# Patient Record
Sex: FEMALE | Race: WHITE | NOT HISPANIC OR LATINO | Employment: OTHER | URBAN - NONMETROPOLITAN AREA
[De-identification: names, ages, dates, MRNs, and addresses within clinical notes are randomized per-mention and may not be internally consistent; named-entity substitution may affect disease eponyms.]

---

## 2023-03-08 LAB
ANION GAP IN SER/PLAS: 13 MMOL/L (ref 10–20)
CALCIUM (MG/DL) IN SER/PLAS: 9.2 MG/DL (ref 8.6–10.3)
CARBON DIOXIDE, TOTAL (MMOL/L) IN SER/PLAS: 24 MMOL/L (ref 21–32)
CHLORIDE (MMOL/L) IN SER/PLAS: 104 MMOL/L (ref 98–107)
CREATININE (MG/DL) IN SER/PLAS: 0.77 MG/DL (ref 0.5–1.05)
GFR FEMALE: 78 ML/MIN/1.73M2
GLUCOSE (MG/DL) IN SER/PLAS: 138 MG/DL (ref 74–99)
POTASSIUM (MMOL/L) IN SER/PLAS: 3.3 MMOL/L (ref 3.5–5.3)
SODIUM (MMOL/L) IN SER/PLAS: 138 MMOL/L (ref 136–145)
UREA NITROGEN (MG/DL) IN SER/PLAS: 13 MG/DL (ref 6–23)

## 2023-08-31 PROBLEM — M46.1 SACROILIITIS, NOT ELSEWHERE CLASSIFIED (CMS-HCC): Status: ACTIVE | Noted: 2023-08-31

## 2023-08-31 PROBLEM — M85.80 OSTEOPENIA: Status: ACTIVE | Noted: 2023-08-31

## 2023-08-31 PROBLEM — M54.30 SCIATICA: Status: ACTIVE | Noted: 2023-08-31

## 2023-08-31 PROBLEM — M19.90 OSTEOARTHRITIS: Status: ACTIVE | Noted: 2023-08-31

## 2023-08-31 PROBLEM — K57.92 DIVERTICULITIS: Status: ACTIVE | Noted: 2023-08-31

## 2023-08-31 PROBLEM — I21.4 NON-ST ELEVATION MYOCARDIAL INFARCTION (NSTEMI) (MULTI): Status: ACTIVE | Noted: 2023-08-31

## 2023-08-31 PROBLEM — I48.91 ATRIAL FIBRILLATION WITH RVR (MULTI): Status: ACTIVE | Noted: 2023-08-31

## 2023-08-31 PROBLEM — I83.893 VARICOSE VEINS OF BOTH LEGS WITH EDEMA: Status: ACTIVE | Noted: 2023-08-31

## 2023-08-31 PROBLEM — K86.2 PANCREATIC CYST (HHS-HCC): Status: ACTIVE | Noted: 2023-08-31

## 2023-08-31 PROBLEM — H90.3 ASYMMETRIC SNHL (SENSORINEURAL HEARING LOSS): Status: ACTIVE | Noted: 2023-08-31

## 2023-08-31 PROBLEM — E78.5 HYPERLIPIDEMIA: Status: ACTIVE | Noted: 2023-08-31

## 2023-08-31 PROBLEM — N95.2 ATROPHIC VAGINITIS: Status: ACTIVE | Noted: 2023-08-31

## 2023-08-31 PROBLEM — E55.9 VITAMIN D DEFICIENCY: Status: ACTIVE | Noted: 2023-08-31

## 2023-08-31 PROBLEM — R73.03 PREDIABETES: Status: ACTIVE | Noted: 2023-08-31

## 2023-08-31 PROBLEM — E11.9 TYPE 2 DIABETES MELLITUS WITHOUT COMPLICATION, WITHOUT LONG-TERM CURRENT USE OF INSULIN (MULTI): Status: ACTIVE | Noted: 2023-08-31

## 2023-08-31 PROBLEM — E78.5 DYSLIPIDEMIA: Status: ACTIVE | Noted: 2023-08-31

## 2023-08-31 PROBLEM — H93.299 ABNORMAL AUDITORY PERCEPTION: Status: ACTIVE | Noted: 2023-08-31

## 2023-08-31 RX ORDER — DAPAGLIFLOZIN 5 MG/1
1 TABLET, FILM COATED ORAL DAILY
COMMUNITY
Start: 2023-03-13 | End: 2024-04-02 | Stop reason: SDUPTHER

## 2023-08-31 RX ORDER — NYSTATIN 100000 [USP'U]/G
1 POWDER TOPICAL AS NEEDED
COMMUNITY
Start: 2022-10-25

## 2023-08-31 RX ORDER — VIT C/E/ZN/COPPR/LUTEIN/ZEAXAN 250MG-90MG
1 CAPSULE ORAL DAILY
COMMUNITY
End: 2023-11-27 | Stop reason: ALTCHOICE

## 2023-08-31 RX ORDER — METOPROLOL SUCCINATE 50 MG/1
1 TABLET, EXTENDED RELEASE ORAL DAILY
COMMUNITY

## 2023-08-31 RX ORDER — ATORVASTATIN CALCIUM 40 MG/1
1 TABLET, FILM COATED ORAL DAILY
COMMUNITY
End: 2024-04-05 | Stop reason: SDUPTHER

## 2023-08-31 RX ORDER — CLOPIDOGREL BISULFATE 75 MG/1
1 TABLET ORAL DAILY
COMMUNITY
End: 2024-04-05 | Stop reason: SDUPTHER

## 2023-08-31 RX ORDER — LISINOPRIL 5 MG/1
1 TABLET ORAL DAILY
COMMUNITY
End: 2023-11-27 | Stop reason: ALTCHOICE

## 2023-08-31 RX ORDER — FUROSEMIDE 40 MG/1
1 TABLET ORAL AS NEEDED
COMMUNITY
End: 2023-11-27 | Stop reason: ALTCHOICE

## 2023-08-31 RX ORDER — FLUTICASONE PROPIONATE 50 MCG
1 SPRAY, SUSPENSION (ML) NASAL DAILY PRN
COMMUNITY
Start: 2020-11-10 | End: 2023-11-27 | Stop reason: SDUPTHER

## 2023-08-31 RX ORDER — SPIRONOLACTONE 25 MG/1
0.5 TABLET ORAL DAILY
COMMUNITY
End: 2024-02-16 | Stop reason: SDUPTHER

## 2023-08-31 RX ORDER — MELOXICAM 15 MG/1
1 TABLET ORAL DAILY
COMMUNITY
Start: 2013-09-16 | End: 2023-11-27 | Stop reason: ALTCHOICE

## 2023-10-31 ENCOUNTER — CLINICAL SUPPORT (OUTPATIENT)
Dept: AUDIOLOGY | Facility: CLINIC | Age: 78
End: 2023-10-31
Payer: MEDICARE

## 2023-10-31 ENCOUNTER — CLINICAL SUPPORT (OUTPATIENT)
Dept: AUDIOLOGY | Facility: CLINIC | Age: 78
End: 2023-10-31

## 2023-10-31 ENCOUNTER — OFFICE VISIT (OUTPATIENT)
Dept: OTOLARYNGOLOGY | Facility: CLINIC | Age: 78
End: 2023-10-31
Payer: MEDICARE

## 2023-10-31 VITALS — HEIGHT: 59 IN | TEMPERATURE: 96.6 F | BODY MASS INDEX: 37.5 KG/M2 | WEIGHT: 186 LBS

## 2023-10-31 DIAGNOSIS — H90.3 SENSORINEURAL HEARING LOSS (SNHL) OF BOTH EARS: Primary | ICD-10-CM

## 2023-10-31 DIAGNOSIS — H90.A32 MIXED CONDUCTIVE AND SENSORINEURAL HEARING LOSS OF LEFT EAR WITH RESTRICTED HEARING OF RIGHT EAR: ICD-10-CM

## 2023-10-31 DIAGNOSIS — H69.92 DYSFUNCTION OF LEFT EUSTACHIAN TUBE: Primary | ICD-10-CM

## 2023-10-31 DIAGNOSIS — B99.9 INFECTION: ICD-10-CM

## 2023-10-31 DIAGNOSIS — H90.3 ASYMMETRICAL SENSORINEURAL HEARING LOSS: ICD-10-CM

## 2023-10-31 PROCEDURE — 92550 TYMPANOMETRY & REFLEX THRESH: CPT | Performed by: AUDIOLOGIST

## 2023-10-31 PROCEDURE — 1160F RVW MEDS BY RX/DR IN RCRD: CPT | Performed by: OTOLARYNGOLOGY

## 2023-10-31 PROCEDURE — 1036F TOBACCO NON-USER: CPT | Performed by: OTOLARYNGOLOGY

## 2023-10-31 PROCEDURE — HRANC PR HEARING AID NO CHARGE: Performed by: AUDIOLOGIST

## 2023-10-31 PROCEDURE — 1159F MED LIST DOCD IN RCRD: CPT | Performed by: OTOLARYNGOLOGY

## 2023-10-31 PROCEDURE — 99213 OFFICE O/P EST LOW 20 MIN: CPT | Performed by: OTOLARYNGOLOGY

## 2023-10-31 PROCEDURE — 92557 COMPREHENSIVE HEARING TEST: CPT | Performed by: AUDIOLOGIST

## 2023-10-31 RX ORDER — DOXYCYCLINE 100 MG/1
100 CAPSULE ORAL 2 TIMES DAILY
Qty: 20 CAPSULE | Refills: 0 | Status: SHIPPED | OUTPATIENT
Start: 2023-10-31 | End: 2023-11-10

## 2023-10-31 RX ORDER — METOPROLOL SUCCINATE 50 MG/1
1 TABLET, EXTENDED RELEASE ORAL EVERY 24 HOURS
COMMUNITY
End: 2023-11-27 | Stop reason: ALTCHOICE

## 2023-10-31 NOTE — PROGRESS NOTES
HEARING AID CHECK  HEARING AID CHECK    RIGHT: PHONAK AUDEO P 90 R #1 LENGTH M  SN: [id number], SMALL VENTED DOME  LEFT: PHONAK AUDEO P 90 R #1 LENGTH M  SN: [id number] SMALL  VENTED   FIT DATE: 12/28/21  WARRANTY: 3/7/2025    TODAY: 10/31/23  HA CHECK AFTER AUDIO AND DRYfn VISIT FOR EAR ACHE.  PUT NEW RESULTS IN EMILY AND RECALCULATED AND DISCUSSED CHANGE IN HEARING.  WEARING AIDS WELL AND RAN FB AND NOW WEARING SMALL VENTED DOMES  The patient paid their balance in full at time of fitting . Return in 6 MONTHS to review changing [employer], or sooner if needed. DISCUSSED WAX TRAP CHANGES AND WEARING AIDS AT LEAST 5-6 HOURS PER DAY INSTEAD OF 4.     PS VISITATIENT WILL BE BILLED $65.00 FOR TODAY       RIGHT:  LEFT:  FIT DATE:  WARRANTY:    This patient was seen for a HAC with the complaint that      Otoscopy     The following programming changes were made:    The patient paid  for today's visit.  Return in 6 months or sooner if needed.    APPOINTMENT TIME:

## 2023-10-31 NOTE — PROGRESS NOTES
AUDIOLOGY ADULT AUDIOMETRIC EVALUATION    Name:  Laurence Andino  :  1945  Age:  78 y.o.  Date of Evaluation:  2023    Reason for visit: Patient is seen in the clinic today at the request of otolaryngology for an audiologic evaluation.     HISTORY  Patient complains of left ear infection in past and hearing loss worsening.  Wears aids well.    EVALUATION  See scanned audiogram: “Media” > “Audiology Report”.  No images are attached to the encounter or orders placed in the encounter.    RESULTS  Otoscopic Evaluation:  Right Ear: clear ear canal  Left Ear: clear ear canal    Immittance Measures:  Tympanometry:  Right Ear: A  Left Ear:   B    Acoustic Reflexes:  Ipsilateral Right Ear: NR NR NR   Ipsilateral Left Ear:    NR NR NR   Contralateral Right Ear: did not evaluate  Contralateral Left Ear: did not evaluate    Distortion Product Otoacoustic Emissions (DPOAEs):  Right Ear: DNT  Left Ear:    DNT    Audiometry:  Test Technique and Reliability: BEHAVIOR  Standard audiometry via supra-aural headphones. Reliability is  FAIR/good.    Pure tone air and bone conduction audiometry:  Right Ear: SLIGHTLY DOWN FROM  MODERATE MILD SNHL   Left Ear: SLIGHTLY DOWN FROM  TEST, MODERATE TO SEVERE SNHL , ASYMMETRIC LOSS    Speech Audiometry (Word Recognition Scores):   Right Ear: 88  Left Ear:    84    IMPRESSIONS  SLIGHTLY WORSENED SNHL IN BOTH EARS AND SEEING DOCTOR TODAY AND A HEARING AID CHECK AFTER.  The presence of acoustic reflexes within normal intensity limits is consistent with normal middle ear and brainstem function, and suggests that auditory sensitivity is not significantly impaired. An elevated or absent acoustic reflex threshold is consistent with a middle ear disorder, hearing loss in the stimulated ear, and/or interruption of neural innervation of the stapedius muscle. Present DPOAEs suggest normal/near normal cochlear outer hair cell function and are consistent with no greater than a  mild hearing loss at those frequencies. Absent DPOAEs are consistent with abnormal cochlear outer hair cell function and some degree of hearing loss at those frequencies.    RECOMMENDATIONS  - Follow up with otolaryngology today as scheduled.  - Audiologic evaluation as needed.  - Annual audiologic evaluation, sooner if an acute change is noted.  - Audiologic evaluation in conjunction with otologic care, if an acute change is noted, and/or annually.  clinic.  - Continued hearing aid use.  - Follow-up with audiology annually for routine hearing aid maintenance, sooner if questions/problems arise.  - Follow-up with medical care team as planned.    PATIENT EDUCATION  Discussed results, impressions and recommendations with the patient. Questions were addressed and the patient was encouraged to contact our office should concerns arise.    Time for this encounter:30

## 2023-10-31 NOTE — PROGRESS NOTES
HPI  Laurence Andino is a 78 y.o. female history of left greater than right sensorineural hearing loss, last tested in 2021 with asymmetry and normal ABR at that time.  She has generally been doing well with bilateral hearing aids.  Recently with 5 to 7 days of nasal and sinus congestion and facial pressure.  She has developed left-sided ear discomfort and additional hearing loss.  Audiogram today with mixed hearing loss on the left type B tympanogram on the left.  Sensorineural hearing loss on the right, type a tympanogram on the right.          Past Medical History:   Diagnosis Date    A-fib (CMS/[facility])     Bleeding disorder (CMS/[facility])     Coronary artery disease     Encounter for gynecological examination (general) (routine) without abnormal findings     Encounter for gynecological examination without abnormal finding    Encounter for screening mammogram for malignant neoplasm of breast     Visit for screening mammogram    Hypertension     Other specified disorders of eustachian tube, bilateral 08/31/2021    Dysfunction of both eustachian tubes    Personal history of other diseases of the musculoskeletal system and connective tissue     History of osteopenia            Medications:     Current Outpatient Medications:     apixaban (Eliquis) 5 mg tablet, Take 1 tablet (5 mg) by mouth 2 times a day., Disp: , Rfl:     atorvastatin (Lipitor) 40 mg tablet, Take 1 tablet (40 mg) by mouth once daily., Disp: , Rfl:     cholecalciferol (Vitamin D-3) 25 MCG (1000 UT) capsule, Take 1 capsule (25 mcg) by mouth once daily., Disp: , Rfl:     clopidogrel (Plavix) 75 mg tablet, Take 1 tablet (75 mg) by mouth once daily., Disp: , Rfl:     dapagliflozin propanediol (Farxiga) 5 mg, Take 1 tablet (5 mg) by mouth once daily., Disp: , Rfl:     metoprolol succinate XL (Toprol-XL) 50 mg 24 hr tablet, Take 1 tablet (50 mg) by mouth once daily., Disp: , Rfl:     spironolactone (Aldactone) 25 mg tablet, Take 0.5 tablets (12.5 mg) by mouth once  "daily., Disp: , Rfl:     fluticasone (Flonase) 50 mcg/actuation nasal spray, Administer 1 spray into each nostril once daily as needed., Disp: , Rfl:     furosemide (Lasix) 40 mg tablet, Take 1 tablet (40 mg) by mouth if needed. for leg edema, SOB, or > 5 lb weight gain Once a day, Disp: , Rfl:     lisinopril 5 mg tablet, Take 1 tablet (5 mg) by mouth once daily., Disp: , Rfl:     meloxicam (Mobic) 15 mg tablet, Take 1 tablet (15 mg) by mouth once daily. After a meal, Disp: , Rfl:     metoprolol succinate XL (Toprol-XL) 50 mg 24 hr tablet, 1 capsule once every 24 hours., Disp: , Rfl:     nystatin (Mycostatin) 100,000 unit/gram powder, Apply 1 Application topically 2 times a day., Disp: , Rfl:     ticagrelor (Brilinta) 90 mg tablet, Take 1 tablet (90 mg) by mouth 2 times a day., Disp: , Rfl:      Allergies:  Allergies   Allergen Reactions    Shellfish Containing Products Anaphylaxis    Ciprofloxacin Other     vomiting    Erythromycin Base Unknown    Penicillins Hives        Physical Exam:  Last Recorded Vitals  Temperature 35.9 °C (96.6 °F), height 1.499 m (4' 11\"), weight 84.4 kg (186 lb).  General:     General appearance: Well-developed, well-nourished in no acute distress.       Voice:  normal       Head/face: Normal appearance; nontender to palpation     Facial nerve function: Normal and symmetric bilaterally.    Oral/oropharynx:     Oral vestibule: Normal labial and gingival mucosa     Tongue/floor of mouth: Normal without lesion     Oropharynx: Clear.  No lesions present of the hard/soft palate, posterior pharynx    Neck:     Neck: Normal appearance, trachea midline     Salivary glands: Normal to palpation bilaterally     Lymph nodes: No cervical lymphadenopathy to palpation     Thyroid: No thyromegaly.  No palpable nodules     Range of motion: Normal    Neurological:     Cortical functions: Alert and oriented x3, appropriate affect       Larynx/hypopharynx:     Laryngeal findings: Mirror exam inadequate or " limited secondary to enlarged base of tongue and/or excessive gagging    Ear:     Ear canal: Normal bilaterally     Tympanic membrane: Intact and mobile right, middle ear aerated.  Left side with serous effusion.     Pinna: Normal bilaterally     Hearing:  Gross hearing assessment normal by voice    Nose:     Visualized using: Anterior rhinoscopy     Nasopharynx: Inadequate mirror exam secondary to gag, anatomy.       Nasal dorsum: Nontraumatic midline appearance     Septum: Midline     Inferior turbinates: Normally sized     Mucosa: Bilateral, pink, normal appearing       Assessment/Plan   Left mixed hearing loss with serous effusion presumably acute secondary to sinus infection symptoms.  Recommend course of antibiotics and more faithful use of her Flonase and I will see her back in 8 weeks, sooner as needed         Satinder Agrawal MD

## 2023-11-12 PROBLEM — E11.9 TYPE 2 DIABETES MELLITUS WITHOUT COMPLICATION, WITHOUT LONG-TERM CURRENT USE OF INSULIN (MULTI): Status: RESOLVED | Noted: 2023-08-31 | Resolved: 2023-11-12

## 2023-11-12 ASSESSMENT — ENCOUNTER SYMPTOMS
SHORTNESS OF BREATH: 0
ABDOMINAL PAIN: 0
CHILLS: 0
COUGH: 0
HEADACHES: 0
VOMITING: 0
FEVER: 0
APPETITE CHANGE: 0
DIARRHEA: 0
NAUSEA: 0

## 2023-11-12 NOTE — PROGRESS NOTES
[facility]: [facility]  MEDICARE WELLNESS EXAM      Laurence Andino is a 78 y.o. female that is presenting today for Annual Exam.    Assessment/Plan    Problem List Items Addressed This Visit             ICD-10-CM    Atrial fibrillation with RVR (CMS/[facility]) I48.91    Dyslipidemia E78.5    Hyperlipidemia E78.5    Osteopenia M85.80    Prediabetes R73.03    Vitamin D deficiency E55.9     Other Visit Diagnoses         Codes    Annual physical exam    -  Primary Z00.00    Relevant Medications    fluticasone (Flonase) 50 mcg/actuation nasal spray    Encounter for routine laboratory testing     Z01.89    Hyperglycemia     R73.9    Encounter for screening mammogram for breast cancer     Z12.31    Gait instability     R26.81    Relevant Orders    Disability Placard          ADVANCED CARE PLANNING  Advanced Care Planning was discussed with patient:  The patient has an active advanced care plan on file. The patient has an active surrogate decision-maker on file.  The patient was encouraged to ensure that any/all documentation is accurate and up to date, and that our office be provided a copy in the event that anything changes.    ACTIVITIES OF DAILY LIVING  Basic ADLs:  Bathing: Independent, Dressing: Independent, Toileting: Independent, Transferring: Independent, Continence: Independent, Feeding: Independent.    Instrumental ADLs:  Ability to use phone: Independent, Shopping: Independent, Cooking: Independent, House-keeping: Independent, Laundry: Independent, Transportation: Independent, Medication Management: Independent, Finance Management: Independent.    Subjective   This patient presents today for annual physical, Medicare wellness exam.  Discussed screening/prevention, healthy lifestyle and code status.   Reviewed the patient's wishing regarding decision making.        Review of Systems   Constitutional:  Negative for appetite change, chills and fever.   Respiratory:  Negative for cough and  shortness of breath.    Cardiovascular:  Negative for chest pain.   Gastrointestinal:  Negative for abdominal pain, diarrhea, nausea and vomiting.   Neurological:  Negative for headaches.   All other systems reviewed and are negative.    Objective   Vitals:    11/27/23 1415   BP: 130/78   Pulse: 82   Temp: 35.9 °C (96.6 °F)   SpO2: 99%      Body mass index is 37.37 kg/m².  Physical Exam  Vitals reviewed.   Constitutional:       General: She is not in acute distress.     Appearance: She is not toxic-appearing.   HENT:      Head: Normocephalic and atraumatic.      Mouth/Throat:      Mouth: Mucous membranes are moist.   Eyes:      Pupils: Pupils are equal, round, and reactive to light.   Cardiovascular:      Rate and Rhythm: Normal rate and regular rhythm.      Heart sounds: No murmur heard.  Pulmonary:      Breath sounds: Normal breath sounds. No wheezing, rhonchi or rales.   Abdominal:      General: There is no distension.      Palpations: Abdomen is soft.   Musculoskeletal:      Right lower leg: No edema.      Left lower leg: No edema.   Neurological:      General: No focal deficit present.      Mental Status: She is alert and oriented to person, place, and time.       Diagnostic Results   Lab Results   Component Value Date    GLUCOSE 95 11/21/2023    CALCIUM 9.6 11/21/2023     11/21/2023    K 4.0 11/21/2023    CO2 28 11/21/2023     11/21/2023    BUN 18 11/21/2023    CREATININE 0.92 11/21/2023     Lab Results   Component Value Date    ALT 21 11/21/2023    AST 21 11/21/2023    ALKPHOS 57 11/21/2023    BILITOT 0.8 11/21/2023     Lab Results   Component Value Date    WBC 6.7 11/21/2023    HGB 14.2 11/21/2023    HCT 44.9 11/21/2023    MCV 93 11/21/2023     11/21/2023     Lab Results   Component Value Date    CHOL 118 11/21/2023    CHOL 95 02/10/2023    CHOL 154 11/14/2022     Lab Results   Component Value Date    HDL 51.8 11/21/2023    HDL 26.0 (A) 02/10/2023    HDL 58 11/14/2022     Lab Results  "  Component Value Date    LDLCALC 52 11/21/2023    LDLCALC 82 11/14/2022    LDLCALC 88 11/29/2021     Lab Results   Component Value Date    TRIG 71 11/21/2023    TRIG 91 02/10/2023    TRIG 68 11/14/2022     No components found for: \"CHOLHDL\"  Lab Results   Component Value Date    HGBA1C 5.4 11/21/2023     Other labs not included in the list above reviewed either before or during this encounter.    History   Past Medical History:   Diagnosis Date    A-fib (CMS/HCC)     Bleeding disorder (CMS/HCC)     Coronary artery disease     Encounter for gynecological examination (general) (routine) without abnormal findings     Encounter for gynecological examination without abnormal finding    Encounter for screening mammogram for malignant neoplasm of breast     Visit for screening mammogram    Hypertension     Other specified disorders of eustachian tube, bilateral 08/31/2021    Dysfunction of both eustachian tubes    Personal history of other diseases of the musculoskeletal system and connective tissue     History of osteopenia     Past Surgical History:   Procedure Laterality Date    CARDIAC SURGERY      COLONOSCOPY  05/14/2013    Complete Colonoscopy     Family History   Problem Relation Name Age of Onset    Heart disease Mother      Asthma Mother      Diabetes Mother      Heart disease Father      Diabetes Father      No Known Problems Son       Social History     Socioeconomic History    Marital status:      Spouse name: Not on file    Number of children: Not on file    Years of education: Not on file    Highest education level: Not on file   Occupational History    Not on file   Tobacco Use    Smoking status: Former     Types: Cigarettes    Smokeless tobacco: Not on file   Substance and Sexual Activity    Alcohol use: Never    Drug use: Never    Sexual activity: Not on file   Other Topics Concern    Not on file   Social History Narrative    Not on file     Social Determinants of Health     Financial Resource " Strain: Not on file   Food Insecurity: Not on file   Transportation Needs: Not on file   Physical Activity: Not on file   Stress: Not on file   Social Connections: Not on file   Intimate Partner Violence: Not on file   Housing Stability: Not on file     Allergies   Allergen Reactions    Shellfish Containing Products Anaphylaxis    Ciprofloxacin Other     vomiting    Erythromycin Base Unknown    Penicillins Hives     Current Outpatient Medications on File Prior to Visit   Medication Sig Dispense Refill    apixaban (Eliquis) 5 mg tablet Take 1 tablet (5 mg) by mouth 2 times a day.      atorvastatin (Lipitor) 40 mg tablet Take 1 tablet (40 mg) by mouth once daily.      cholecalciferol (Vitamin D3) 50 mcg (2,000 unit) capsule Take 1 capsule (50 mcg) by mouth once daily.      clopidogrel (Plavix) 75 mg tablet Take 1 tablet (75 mg) by mouth once daily.      dapagliflozin propanediol (Farxiga) 5 mg Take 1 tablet (5 mg) by mouth once daily.      losartan (Cozaar) 50 mg tablet Take 1 tablet (50 mg) by mouth once daily.      metoprolol succinate XL (Toprol-XL) 50 mg 24 hr tablet Take 1 tablet (50 mg) by mouth once daily.      nystatin (Mycostatin) 100,000 unit/gram powder Apply 1 Application topically 2 times a day.      spironolactone (Aldactone) 25 mg tablet Take 0.5 tablets (12.5 mg) by mouth once daily.      [DISCONTINUED] fluticasone (Flonase) 50 mcg/actuation nasal spray Administer 1 spray into each nostril once daily as needed.      [DISCONTINUED] cholecalciferol (Vitamin D-3) 25 MCG (1000 UT) capsule Take 1 capsule (25 mcg) by mouth once daily.      [DISCONTINUED] furosemide (Lasix) 40 mg tablet Take 1 tablet (40 mg) by mouth if needed. for leg edema, SOB, or > 5 lb weight gain Once a day      [DISCONTINUED] lisinopril 5 mg tablet Take 1 tablet (5 mg) by mouth once daily.      [DISCONTINUED] meloxicam (Mobic) 15 mg tablet Take 1 tablet (15 mg) by mouth once daily. After a meal      [DISCONTINUED] metoprolol succinate  XL (Toprol-XL) 50 mg 24 hr tablet 1 capsule once every 24 hours.      [DISCONTINUED] ticagrelor (Brilinta) 90 mg tablet Take 1 tablet (90 mg) by mouth 2 times a day.       No current facility-administered medications on file prior to visit.     Immunization History   Administered Date(s) Administered    Flu vaccine (IIV4), preservative free *Check age/dose* 10/19/2022    Flu vaccine, quadrivalent, high-dose, preservative free, age 65y+ (FLUZONE) 09/21/2020, 10/14/2022    Influenza, High Dose Seasonal, Preservative Free 09/23/2017, 09/12/2018, 09/23/2019, 10/07/2019    Influenza, Seasonal, Quadrivalent, Adjuvanted 09/17/2021    Influenza, Unspecified 11/17/2009, 10/12/2010, 09/16/2013    Influenza, injectable, quadrivalent 09/13/2021    Influenza, seasonal, injectable 09/13/2011, 09/11/2012, 09/22/2014, 09/08/2015, 09/08/2016, 09/14/2016    Moderna SARS-CoV-2 Vaccination 03/18/2021    Pfizer COVID-19 vaccine, bivalent, age 12 years and older (30 mcg/0.3 mL) 06/08/2023    Pfizer Purple Cap SARS-CoV-2 02/25/2021, 11/09/2021    Pneumococcal conjugate vaccine, 13-valent (PREVNAR 13) 10/01/2012, 01/26/2015, 09/12/2018, 10/07/2019    Pneumococcal polysaccharide vaccine, 23-valent, age 2 years and older (PNEUMOVAX 23) 10/12/2010, 12/12/2010, 09/23/2019    Tdap vaccine, age 7 year and older (BOOSTRIX) 02/28/2019    Zoster vaccine, recombinant, adult (SHINGRIX) 02/28/2019, 05/16/2019     Patient's medical history was reviewed and updated either before or during this encounter.       Fernando Dallas MD

## 2023-11-21 ENCOUNTER — LAB (OUTPATIENT)
Dept: LAB | Facility: LAB | Age: 78
End: 2023-11-21
Payer: MEDICARE

## 2023-11-21 DIAGNOSIS — E78.5 HYPERLIPIDEMIA, UNSPECIFIED: ICD-10-CM

## 2023-11-21 DIAGNOSIS — E55.9 VITAMIN D DEFICIENCY, UNSPECIFIED: ICD-10-CM

## 2023-11-21 DIAGNOSIS — R73.01 IMPAIRED FASTING GLUCOSE: Primary | ICD-10-CM

## 2023-11-21 LAB
ALBUMIN SERPL BCP-MCNC: 4.4 G/DL (ref 3.4–5)
ALP SERPL-CCNC: 57 U/L (ref 33–136)
ALT SERPL W P-5'-P-CCNC: 21 U/L (ref 7–45)
ANION GAP SERPL CALC-SCNC: 12 MMOL/L (ref 10–20)
AST SERPL W P-5'-P-CCNC: 21 U/L (ref 9–39)
BASOPHILS # BLD AUTO: 0.06 X10*3/UL (ref 0–0.1)
BASOPHILS NFR BLD AUTO: 0.9 %
BILIRUB SERPL-MCNC: 0.8 MG/DL (ref 0–1.2)
BUN SERPL-MCNC: 18 MG/DL (ref 6–23)
CALCIUM SERPL-MCNC: 9.6 MG/DL (ref 8.6–10.3)
CHLORIDE SERPL-SCNC: 103 MMOL/L (ref 98–107)
CHOLEST SERPL-MCNC: 118 MG/DL (ref 0–199)
CHOLESTEROL/HDL RATIO: 2.3
CO2 SERPL-SCNC: 28 MMOL/L (ref 21–32)
CREAT SERPL-MCNC: 0.92 MG/DL (ref 0.5–1.05)
EOSINOPHIL # BLD AUTO: 0.21 X10*3/UL (ref 0–0.4)
EOSINOPHIL NFR BLD AUTO: 3.2 %
ERYTHROCYTE [DISTWIDTH] IN BLOOD BY AUTOMATED COUNT: 13.8 % (ref 11.5–14.5)
GFR SERPL CREATININE-BSD FRML MDRD: 63 ML/MIN/1.73M*2
GLUCOSE SERPL-MCNC: 95 MG/DL (ref 74–99)
HCT VFR BLD AUTO: 44.9 % (ref 36–46)
HDLC SERPL-MCNC: 51.8 MG/DL
HGB BLD-MCNC: 14.2 G/DL (ref 12–16)
IMM GRANULOCYTES # BLD AUTO: 0.03 X10*3/UL (ref 0–0.5)
IMM GRANULOCYTES NFR BLD AUTO: 0.5 % (ref 0–0.9)
LDLC SERPL CALC-MCNC: 52 MG/DL
LYMPHOCYTES # BLD AUTO: 2.01 X10*3/UL (ref 0.8–3)
LYMPHOCYTES NFR BLD AUTO: 30.2 %
MCH RBC QN AUTO: 29.5 PG (ref 26–34)
MCHC RBC AUTO-ENTMCNC: 31.6 G/DL (ref 32–36)
MCV RBC AUTO: 93 FL (ref 80–100)
MONOCYTES # BLD AUTO: 0.72 X10*3/UL (ref 0.05–0.8)
MONOCYTES NFR BLD AUTO: 10.8 %
NEUTROPHILS # BLD AUTO: 3.63 X10*3/UL (ref 1.6–5.5)
NEUTROPHILS NFR BLD AUTO: 54.4 %
NON HDL CHOLESTEROL: 66 MG/DL (ref 0–149)
NRBC BLD-RTO: 0 /100 WBCS (ref 0–0)
PLATELET # BLD AUTO: 278 X10*3/UL (ref 150–450)
POTASSIUM SERPL-SCNC: 4 MMOL/L (ref 3.5–5.3)
PROT SERPL-MCNC: 7.5 G/DL (ref 6.4–8.2)
RBC # BLD AUTO: 4.81 X10*6/UL (ref 4–5.2)
SODIUM SERPL-SCNC: 139 MMOL/L (ref 136–145)
TRIGL SERPL-MCNC: 71 MG/DL (ref 0–149)
TSH SERPL-ACNC: 1.99 MIU/L (ref 0.44–3.98)
VLDL: 14 MG/DL (ref 0–40)
WBC # BLD AUTO: 6.7 X10*3/UL (ref 4.4–11.3)

## 2023-11-21 PROCEDURE — 82306 VITAMIN D 25 HYDROXY: CPT

## 2023-11-21 PROCEDURE — 83036 HEMOGLOBIN GLYCOSYLATED A1C: CPT

## 2023-11-21 PROCEDURE — 36415 COLL VENOUS BLD VENIPUNCTURE: CPT

## 2023-11-22 LAB
25(OH)D3 SERPL-MCNC: 48 NG/ML (ref 30–100)
EST. AVERAGE GLUCOSE BLD GHB EST-MCNC: 108 MG/DL
HBA1C MFR BLD: 5.4 %

## 2023-11-27 ENCOUNTER — OFFICE VISIT (OUTPATIENT)
Dept: PRIMARY CARE | Facility: CLINIC | Age: 78
End: 2023-11-27
Payer: MEDICARE

## 2023-11-27 VITALS
DIASTOLIC BLOOD PRESSURE: 78 MMHG | OXYGEN SATURATION: 99 % | HEART RATE: 82 BPM | BODY MASS INDEX: 37.37 KG/M2 | TEMPERATURE: 96.6 F | SYSTOLIC BLOOD PRESSURE: 130 MMHG | WEIGHT: 185 LBS

## 2023-11-27 DIAGNOSIS — Z12.31 ENCOUNTER FOR SCREENING MAMMOGRAM FOR BREAST CANCER: ICD-10-CM

## 2023-11-27 DIAGNOSIS — R26.81 GAIT INSTABILITY: ICD-10-CM

## 2023-11-27 DIAGNOSIS — E55.9 VITAMIN D DEFICIENCY: ICD-10-CM

## 2023-11-27 DIAGNOSIS — I48.91 ATRIAL FIBRILLATION WITH RVR (MULTI): ICD-10-CM

## 2023-11-27 DIAGNOSIS — M85.859 OSTEOPENIA OF HIP, UNSPECIFIED LATERALITY: ICD-10-CM

## 2023-11-27 DIAGNOSIS — R73.9 HYPERGLYCEMIA: ICD-10-CM

## 2023-11-27 DIAGNOSIS — E78.2 MIXED HYPERLIPIDEMIA: ICD-10-CM

## 2023-11-27 DIAGNOSIS — Z00.00 ANNUAL PHYSICAL EXAM: Primary | ICD-10-CM

## 2023-11-27 DIAGNOSIS — E78.5 DYSLIPIDEMIA: ICD-10-CM

## 2023-11-27 DIAGNOSIS — Z01.89 ENCOUNTER FOR ROUTINE LABORATORY TESTING: ICD-10-CM

## 2023-11-27 DIAGNOSIS — R73.03 PREDIABETES: ICD-10-CM

## 2023-11-27 PROCEDURE — 1126F AMNT PAIN NOTED NONE PRSNT: CPT | Performed by: INTERNAL MEDICINE

## 2023-11-27 PROCEDURE — 1159F MED LIST DOCD IN RCRD: CPT | Performed by: INTERNAL MEDICINE

## 2023-11-27 PROCEDURE — G0439 PPPS, SUBSEQ VISIT: HCPCS | Performed by: INTERNAL MEDICINE

## 2023-11-27 PROCEDURE — 1160F RVW MEDS BY RX/DR IN RCRD: CPT | Performed by: INTERNAL MEDICINE

## 2023-11-27 RX ORDER — FLUTICASONE PROPIONATE 50 MCG
1 SPRAY, SUSPENSION (ML) NASAL DAILY PRN
Qty: 48 G | Refills: 3 | Status: SHIPPED | OUTPATIENT
Start: 2023-11-27

## 2023-11-27 RX ORDER — LOSARTAN POTASSIUM 50 MG/1
50 TABLET ORAL DAILY
COMMUNITY
End: 2024-01-15 | Stop reason: SDUPTHER

## 2023-11-27 RX ORDER — ACETAMINOPHEN 500 MG
2000 TABLET ORAL DAILY
COMMUNITY

## 2023-11-27 ASSESSMENT — PATIENT HEALTH QUESTIONNAIRE - PHQ9
2. FEELING DOWN, DEPRESSED OR HOPELESS: NOT AT ALL
SUM OF ALL RESPONSES TO PHQ9 QUESTIONS 1 AND 2: 0
1. LITTLE INTEREST OR PLEASURE IN DOING THINGS: NOT AT ALL

## 2023-11-27 ASSESSMENT — ENCOUNTER SYMPTOMS
OCCASIONAL FEELINGS OF UNSTEADINESS: 0
DEPRESSION: 0
LOSS OF SENSATION IN FEET: 0

## 2023-11-27 ASSESSMENT — PAIN SCALES - GENERAL: PAINLEVEL: 0-NO PAIN

## 2023-12-05 ENCOUNTER — HOSPITAL ENCOUNTER (OUTPATIENT)
Dept: RADIOLOGY | Facility: HOSPITAL | Age: 78
Discharge: HOME | End: 2023-12-05
Payer: MEDICARE

## 2023-12-05 DIAGNOSIS — Z12.31 ENCOUNTER FOR SCREENING MAMMOGRAM FOR MALIGNANT NEOPLASM OF BREAST: ICD-10-CM

## 2023-12-05 PROCEDURE — 77067 SCR MAMMO BI INCL CAD: CPT | Performed by: RADIOLOGY

## 2023-12-05 PROCEDURE — 77063 BREAST TOMOSYNTHESIS BI: CPT | Performed by: RADIOLOGY

## 2023-12-05 PROCEDURE — 77063 BREAST TOMOSYNTHESIS BI: CPT

## 2023-12-12 ENCOUNTER — APPOINTMENT (OUTPATIENT)
Dept: OTOLARYNGOLOGY | Facility: CLINIC | Age: 78
End: 2023-12-12
Payer: MEDICARE

## 2024-01-10 DIAGNOSIS — I48.91 ATRIAL FIBRILLATION WITH RVR (MULTI): Primary | ICD-10-CM

## 2024-01-12 ENCOUNTER — TELEMEDICINE (OUTPATIENT)
Dept: PHARMACY | Facility: HOSPITAL | Age: 79
End: 2024-01-12
Payer: MEDICARE

## 2024-01-12 DIAGNOSIS — R73.03 PREDIABETES: Primary | ICD-10-CM

## 2024-01-12 DIAGNOSIS — I48.91 ATRIAL FIBRILLATION WITH RVR (MULTI): ICD-10-CM

## 2024-01-12 NOTE — PROGRESS NOTES
MEDICATION MANAGEMENT    Laurence Andino is a 78 y.o. female who was referred by Fernando Dallas MD to complete medication reconciliation and review with the clinical pharmacist.  A comprehensive medication review was completed with the patient via telephone today.  Patient reports financial barrier with current medication.      MEDICATION HISTORY  Allergies   Allergen Reactions    Shellfish Containing Products Anaphylaxis    Ciprofloxacin Other     vomiting    Erythromycin Base Unknown    Penicillins Hives     Current Outpatient Medications on File Prior to Visit   Medication Sig Dispense Refill    apixaban (Eliquis) 5 mg tablet Take 1 tablet (5 mg) by mouth 2 times a day.      atorvastatin (Lipitor) 40 mg tablet Take 1 tablet (40 mg) by mouth once daily.      cholecalciferol (Vitamin D3) 50 mcg (2,000 unit) capsule Take 1 capsule (50 mcg) by mouth once daily.      clopidogrel (Plavix) 75 mg tablet Take 1 tablet (75 mg) by mouth once daily.      dapagliflozin propanediol (Farxiga) 5 mg Take 1 tablet (5 mg) by mouth once daily.      fluticasone (Flonase) 50 mcg/actuation nasal spray Administer 1 spray into each nostril once daily as needed for rhinitis. 48 g 3    losartan (Cozaar) 50 mg tablet Take 1 tablet (50 mg) by mouth once daily.      metoprolol succinate XL (Toprol-XL) 50 mg 24 hr tablet Take 1 tablet (50 mg) by mouth once daily.      nystatin (Mycostatin) 100,000 unit/gram powder Apply 1 Application topically if needed for itching.      spironolactone (Aldactone) 25 mg tablet Take 0.5 tablets (12.5 mg) by mouth once daily.       No current facility-administered medications on file prior to visit.        Patient Assistance Screening (VAF)  Patient verbally reports monthly or yearly income which is less than 400% federal poverty level.  Application for program has been submitted for the following medications: Eliquis and Farxiga  Patient has been informed that program team will be reaching out to them to  discuss necessary documentation, instructed to answer phone/return voicemail.   Patient aware this process may take up to 6 weeks.   If approved medication must be filled through [facility] pharmacy and may be picked up or mailed to patient.       LABS  There were no vitals taken for this visit.   Lab Results   Component Value Date    HGBA1C 5.4 11/21/2023    HGBA1C 5.1 11/14/2022    HGBA1C 5.3 11/29/2021     Lab Results   Component Value Date    BILITOT 0.8 11/21/2023    CALCIUM 9.6 11/21/2023    CO2 28 11/21/2023     11/21/2023    CREATININE 0.92 11/21/2023    GLUCOSE 95 11/21/2023    ALKPHOS 57 11/21/2023    K 4.0 11/21/2023    PROT 7.5 11/21/2023     11/21/2023    AST 21 11/21/2023    ALT 21 11/21/2023    BUN 18 11/21/2023    ANIONGAP 12 11/21/2023    MG 2.18 02/14/2023    PHOS 3.1 02/14/2023    ALBUMIN 4.4 11/21/2023    GFRF 78 03/08/2023     Lab Results   Component Value Date    TRIG 71 11/21/2023    CHOL 118 11/21/2023    LDLCALC 52 11/21/2023    HDL 51.8 11/21/2023         Assessment/Plan    Patient is doing well on Eliquis, no side effects noted. Provided medication counseling and answered any questions.     Farxiga Education:    - Counseled patient on Farxiga MOA, expectations, side effects, duration of therapy, administration, and monitoring parameters.  - Reviewed the benefits of SGLT-2i therapy, such as glycemic control and kidney and CV protection.  - Advised patient to practice proper  hygiene to reduce risk of UTIs or yeast infections.  - Advised patient to maintain adequate fluid intake to remain hydrated while on SGLT2i therapy.  - Answered all patient questions and concerns.  -Patient has been doing well on Farxiga therapy, no side effects noted.      Amy Guardado, PharmD, BCPS    Verbal consent to manage patient's drug therapy was obtained from the patient and/or an individual authorized to act on behalf of a patient. They were informed they may decline to participate or  withdraw from participation in pharmacy services at any time.

## 2024-01-15 DIAGNOSIS — I21.4 NON-ST ELEVATION MYOCARDIAL INFARCTION (NSTEMI) (MULTI): ICD-10-CM

## 2024-01-15 PROCEDURE — RXMED WILLOW AMBULATORY MEDICATION CHARGE

## 2024-01-16 ENCOUNTER — PHARMACY VISIT (OUTPATIENT)
Dept: PHARMACY | Facility: CLINIC | Age: 79
End: 2024-01-16
Payer: COMMERCIAL

## 2024-01-17 RX ORDER — LOSARTAN POTASSIUM 50 MG/1
50 TABLET ORAL DAILY
Qty: 90 TABLET | Refills: 2 | Status: SHIPPED | OUTPATIENT
Start: 2024-01-17 | End: 2024-02-13 | Stop reason: SDUPTHER

## 2024-02-13 ENCOUNTER — OFFICE VISIT (OUTPATIENT)
Dept: CARDIOLOGY | Facility: CLINIC | Age: 79
End: 2024-02-13
Payer: MEDICARE

## 2024-02-13 VITALS
BODY MASS INDEX: 36.52 KG/M2 | WEIGHT: 186 LBS | HEART RATE: 77 BPM | DIASTOLIC BLOOD PRESSURE: 79 MMHG | RESPIRATION RATE: 16 BRPM | SYSTOLIC BLOOD PRESSURE: 159 MMHG | OXYGEN SATURATION: 96 % | HEIGHT: 60 IN

## 2024-02-13 DIAGNOSIS — I48.91 ATRIAL FIBRILLATION WITH RVR (MULTI): ICD-10-CM

## 2024-02-13 DIAGNOSIS — I48.0 PAROXYSMAL ATRIAL FIBRILLATION (MULTI): Primary | ICD-10-CM

## 2024-02-13 DIAGNOSIS — I21.4 NON-ST ELEVATION MYOCARDIAL INFARCTION (NSTEMI) (MULTI): ICD-10-CM

## 2024-02-13 DIAGNOSIS — I42.9 CARDIOMYOPATHY, UNSPECIFIED TYPE (MULTI): ICD-10-CM

## 2024-02-13 DIAGNOSIS — I25.10 CORONARY ARTERY DISEASE INVOLVING NATIVE CORONARY ARTERY OF NATIVE HEART WITHOUT ANGINA PECTORIS: ICD-10-CM

## 2024-02-13 PROCEDURE — 93005 ELECTROCARDIOGRAM TRACING: CPT | Performed by: INTERNAL MEDICINE

## 2024-02-13 PROCEDURE — 99214 OFFICE O/P EST MOD 30 MIN: CPT | Performed by: INTERNAL MEDICINE

## 2024-02-13 PROCEDURE — 1126F AMNT PAIN NOTED NONE PRSNT: CPT | Performed by: INTERNAL MEDICINE

## 2024-02-13 PROCEDURE — 93010 ELECTROCARDIOGRAM REPORT: CPT | Performed by: INTERNAL MEDICINE

## 2024-02-13 PROCEDURE — 1159F MED LIST DOCD IN RCRD: CPT | Performed by: INTERNAL MEDICINE

## 2024-02-13 RX ORDER — LOSARTAN POTASSIUM 100 MG/1
100 TABLET ORAL DAILY
Qty: 90 TABLET | Refills: 3 | Status: SHIPPED | OUTPATIENT
Start: 2024-02-13 | End: 2025-02-12

## 2024-02-13 ASSESSMENT — ENCOUNTER SYMPTOMS
MUSCULOSKELETAL NEGATIVE: 1
RESPIRATORY NEGATIVE: 1
DEPRESSION: 0
ENDOCRINE NEGATIVE: 1
NEUROLOGICAL NEGATIVE: 1
CONSTITUTIONAL NEGATIVE: 1
PSYCHIATRIC NEGATIVE: 1
CARDIOVASCULAR NEGATIVE: 1
ALLERGIC/IMMUNOLOGIC NEGATIVE: 1
EYES NEGATIVE: 1
HEMATOLOGIC/LYMPHATIC NEGATIVE: 1
GASTROINTESTINAL NEGATIVE: 1

## 2024-02-13 ASSESSMENT — PATIENT HEALTH QUESTIONNAIRE - PHQ9
2. FEELING DOWN, DEPRESSED OR HOPELESS: NOT AT ALL
1. LITTLE INTEREST OR PLEASURE IN DOING THINGS: NOT AT ALL
SUM OF ALL RESPONSES TO PHQ9 QUESTIONS 1 AND 2: 0

## 2024-02-13 NOTE — PROGRESS NOTES
Chief Complaint:   Follow-up (6 Month FUV)     History Of Present Illness:    Laurence Andino is a 78 y.o. female presenting for follow-up.  Feeling well overall, denies any chest pain, shortness of breath, palpitations, dizziness, lower extremity edema.  Compliant medications.     Last Recorded Vitals:  Vitals:    02/13/24 1100   BP: 159/79   BP Location: Left arm   Patient Position: Sitting   BP Cuff Size: Large adult   Pulse: 77   Resp: 16   SpO2: 96%   Weight: 84.4 kg (186 lb)   Height: 1.524 m (5')       Past Medical History:  She has a past medical history of A-fib (CMS/HCC), Bleeding disorder (CMS/HCC), Coronary artery disease, Encounter for gynecological examination (general) (routine) without abnormal findings, Encounter for screening mammogram for malignant neoplasm of breast, Hypertension, Other specified disorders of eustachian tube, bilateral (08/31/2021), and Personal history of other diseases of the musculoskeletal system and connective tissue.    Past Surgical History:  She has a past surgical history that includes Colonoscopy (05/14/2013) and Cardiac surgery.      Social History:  She reports that she has quit smoking. Her smoking use included cigarettes. She does not have any smokeless tobacco history on file. She reports that she does not drink alcohol and does not use drugs.    Family History:  Family History   Problem Relation Name Age of Onset    Heart disease Mother      Asthma Mother      Diabetes Mother      Heart disease Father      Diabetes Father      No Known Problems Son          Allergies:  Shellfish containing products, Ciprofloxacin, Erythromycin base, and Penicillins    Outpatient Medications:  Current Outpatient Medications   Medication Instructions    atorvastatin (Lipitor) 40 mg tablet 1 tablet, oral, Daily    cholecalciferol (VITAMIN D3) 2,000 Units, oral, Daily    clopidogrel (Plavix) 75 mg tablet 1 tablet, oral, Daily    dapagliflozin propanediol (Farxiga) 5 mg 1 tablet, oral,  Daily    Eliquis 5 mg, oral, 2 times daily    fluticasone (Flonase) 50 mcg/actuation nasal spray 1 spray, Each Nostril, Daily PRN    losartan (COZAAR) 50 mg, oral, Daily    metoprolol succinate XL (Toprol-XL) 50 mg 24 hr tablet 1 tablet, oral, Daily    nystatin (Mycostatin) 100,000 unit/gram powder 1 Application, Topical, As needed    spironolactone (Aldactone) 25 mg tablet 0.5 tablets, oral, Daily     Review of Systems   Constitutional: Negative.    HENT: Negative.     Eyes: Negative.    Respiratory: Negative.     Cardiovascular: Negative.    Gastrointestinal: Negative.    Endocrine: Negative.    Genitourinary: Negative.    Musculoskeletal: Negative.    Skin: Negative.    Allergic/Immunologic: Negative.    Neurological: Negative.    Hematological: Negative.    Psychiatric/Behavioral: Negative.          Physical Exam:  Constitutional:       Appearance: Healthy appearance. Not in distress.   Neck:      Vascular: No JVR. JVD normal.   Pulmonary:      Effort: Pulmonary effort is normal.      Breath sounds: Normal breath sounds. No wheezing. No rhonchi. No rales.   Chest:      Chest wall: Not tender to palpatation.   Cardiovascular:      Normal rate. Regular rhythm.      Murmurs: There is no murmur.   Edema:     Peripheral edema absent.   Abdominal:      General: Bowel sounds are normal.      Palpations: Abdomen is soft.      Tenderness: There is no abdominal tenderness.   Musculoskeletal: Normal range of motion. Skin:     General: Skin is warm and dry.   Neurological:      General: No focal deficit present.      Mental Status: Alert and oriented to person, place and time.           Last Labs:  CBC -  Lab Results   Component Value Date    WBC 6.7 11/21/2023    HGB 14.2 11/21/2023    HCT 44.9 11/21/2023    MCV 93 11/21/2023     11/21/2023       CMP -  Lab Results   Component Value Date    CALCIUM 9.6 11/21/2023    PHOS 3.1 02/14/2023    PROT 7.5 11/21/2023    ALBUMIN 4.4 11/21/2023    AST 21 11/21/2023    ALT 21  11/21/2023    ALKPHOS 57 11/21/2023    BILITOT 0.8 11/21/2023       LIPID PANEL -   Lab Results   Component Value Date    CHOL 118 11/21/2023    TRIG 71 11/21/2023    HDL 51.8 11/21/2023    CHHDL 2.3 11/21/2023    LDLF 51 02/10/2023    VLDL 14 11/21/2023    NHDL 66 11/21/2023       RENAL FUNCTION PANEL -   Lab Results   Component Value Date    GLUCOSE 95 11/21/2023     11/21/2023    K 4.0 11/21/2023     11/21/2023    CO2 28 11/21/2023    ANIONGAP 12 11/21/2023    BUN 18 11/21/2023    CREATININE 0.92 11/21/2023    CALCIUM 9.6 11/21/2023    PHOS 3.1 02/14/2023    ALBUMIN 4.4 11/21/2023        Lab Results   Component Value Date     (H) 02/09/2023    HGBA1C 5.4 11/21/2023       Last Cardiology Tests:  ECG independently reviewed from today: sinus rhythm, rate 73, LBBB     Cath 2/13/2023:  1. Left main: no significant angiographic disease.  2. LAD: mild diffuse disease.  3. LCx: 75% proximal disease, 95% mid-vessel stenosis.  4. RCA: 90% mid-vessel stenosis.  5. LVEDP 11mmHg, no aortic stenosis on LV-Ao gradient.  6. Successful PCI to severe prox-mid LCx disease with one Armando Hackett JACKELYN.  7. Successful PCI to severe mid-RCA disease with one [place] Hackett JACKELYN.     Eco 4/7/23:  1. Left ventricular systolic function is mildly to moderately decreased with a 40% estimated ejection fraction.  2. Abnormal septal motion consistent with left bundle branch block.  3. There is global hypokinesis of the left ventricle with minor regional variations.     Assessment/Plan   Very pleasant 77 year-old female with paroxysmal AF, CAD s/p PCI to LCx and RCA in 2/2023, CMO (EF 40%). Doing well from CV standpoint currently, NYHA II.  Blood pressure needs better control     Plan:  -continue current plavix and apixaban with PCI and paroxysmal AFib  -continue current atorvastatin with CAD   -increase losartan to 100mg daily for BP control  -continue current metoprolol and spironolactone for CMO  -f/u in 1 year or sooner if  needed      Adeel Hinkle MD

## 2024-02-16 DIAGNOSIS — I48.91 ATRIAL FIBRILLATION WITH RVR (MULTI): Primary | ICD-10-CM

## 2024-02-16 RX ORDER — SPIRONOLACTONE 25 MG/1
12.5 TABLET ORAL DAILY
Qty: 45 TABLET | Refills: 3 | Status: SHIPPED | OUTPATIENT
Start: 2024-02-16 | End: 2024-05-30 | Stop reason: WASHOUT

## 2024-02-20 LAB
ATRIAL RATE: 73 BPM
P AXIS: 45 DEGREES
P OFFSET: 190 MS
P ONSET: 135 MS
PR INTERVAL: 150 MS
Q ONSET: 210 MS
QRS COUNT: 12 BEATS
QRS DURATION: 128 MS
QT INTERVAL: 438 MS
QTC CALCULATION(BAZETT): 482 MS
QTC FREDERICIA: 467 MS
R AXIS: -63 DEGREES
T AXIS: 87 DEGREES
T OFFSET: 429 MS
VENTRICULAR RATE: 73 BPM

## 2024-02-21 RX ORDER — SPIRONOLACTONE 25 MG/1
25 TABLET ORAL DAILY
Qty: 90 TABLET | Refills: 3 | Status: SHIPPED | OUTPATIENT
Start: 2024-02-21 | End: 2024-05-30 | Stop reason: SDUPTHER

## 2024-04-02 RX ORDER — DAPAGLIFLOZIN 5 MG/1
5 TABLET, FILM COATED ORAL DAILY
Qty: 30 TABLET | Refills: 11 | Status: SHIPPED | OUTPATIENT
Start: 2024-04-02

## 2024-04-05 ENCOUNTER — DOCUMENTATION (OUTPATIENT)
Dept: CARDIOLOGY | Facility: HOSPITAL | Age: 79
End: 2024-04-05
Payer: MEDICARE

## 2024-04-05 DIAGNOSIS — I21.4 NON-ST ELEVATION MYOCARDIAL INFARCTION (NSTEMI) (MULTI): ICD-10-CM

## 2024-04-05 DIAGNOSIS — E78.5 DYSLIPIDEMIA: Primary | ICD-10-CM

## 2024-04-05 RX ORDER — ATORVASTATIN CALCIUM 40 MG/1
40 TABLET, FILM COATED ORAL DAILY
Qty: 90 TABLET | Refills: 3 | Status: SHIPPED | OUTPATIENT
Start: 2024-04-05 | End: 2025-04-05

## 2024-04-05 RX ORDER — CLOPIDOGREL BISULFATE 75 MG/1
75 TABLET ORAL DAILY
Qty: 90 TABLET | Refills: 3 | Status: SHIPPED | OUTPATIENT
Start: 2024-04-05 | End: 2025-04-05

## 2024-04-08 PROCEDURE — RXMED WILLOW AMBULATORY MEDICATION CHARGE

## 2024-04-10 ENCOUNTER — PHARMACY VISIT (OUTPATIENT)
Dept: PHARMACY | Facility: CLINIC | Age: 79
End: 2024-04-10
Payer: COMMERCIAL

## 2024-04-17 PROCEDURE — RXMED WILLOW AMBULATORY MEDICATION CHARGE

## 2024-04-18 ENCOUNTER — SPECIALTY PHARMACY (OUTPATIENT)
Dept: PHARMACY | Facility: CLINIC | Age: 79
End: 2024-04-18

## 2024-04-18 ENCOUNTER — PHARMACY VISIT (OUTPATIENT)
Dept: PHARMACY | Facility: CLINIC | Age: 79
End: 2024-04-18
Payer: COMMERCIAL

## 2024-04-30 ENCOUNTER — CLINICAL SUPPORT (OUTPATIENT)
Dept: AUDIOLOGY | Facility: CLINIC | Age: 79
End: 2024-04-30
Payer: MEDICARE

## 2024-04-30 DIAGNOSIS — H90.3 SENSORINEURAL HEARING LOSS (SNHL) OF BOTH EARS: Primary | ICD-10-CM

## 2024-04-30 NOTE — PROGRESS NOTES
HEARING AID CHECK    RIGHT: PHONAK AUDEO P 90 R #1 LENGTH M  SN: [id number], SMALL VENTED DOME  LEFT: PHONAK AUDEO P 90 R #1 LENGTH M  SN: [id number]  VENTED   FIT DATE: 12/28/21  WARRANTY: 3/7/2025  TODAY: 04/30/24    HA CHECK TODAY AND WEARING 7 HOURS /DAY.MADE NOISE BLOCK MORE AGGRESSIVE AND REVIEWED VC AND WAX FILTER CHANGES WITH PATIENT .  I CAN CHANGE HERE IN [place] IF HAVING DIFFICULTY .    Return in 6 MONTHS to review changing WaxTraps, or sooner if needed.      Otoscopy :  CLEAR AU    The patient paid  $65.00  for today's visit.  Return in 6 months or sooner if needed.    APPOINTMENT TIME: 30 MINUTES

## 2024-05-29 ASSESSMENT — ENCOUNTER SYMPTOMS
COUGH: 0
APPETITE CHANGE: 0
ABDOMINAL PAIN: 0
DIARRHEA: 0
SHORTNESS OF BREATH: 0
CHILLS: 0
HEADACHES: 0
FEVER: 0
NAUSEA: 0
VOMITING: 0

## 2024-05-29 NOTE — PROGRESS NOTES
[facility]: [facility]  PROGRESS NOTE      Laurence Andino is a 78 y.o. female that is presenting today for Follow-up.    Assessment/Plan   Diagnoses and all orders for this visit:  Atrial fibrillation with RVR (Multi)  Dyslipidemia  -     CBC and Auto Differential; Future  -     Comprehensive Metabolic Panel; Future  -     Lipid Panel; Future  -     TSH with reflex to Free T4 if abnormal; Future  Mixed hyperlipidemia  Hyperglycemia  -     Hemoglobin A1C; Future  Vitamin D deficiency  -     Vitamin D 25-Hydroxy,Total (for eval of Vitamin D levels); Future  Encounter for routine laboratory testing  -     CBC and Auto Differential; Future  -     Comprehensive Metabolic Panel; Future  -     Lipid Panel; Future  -     Hemoglobin A1C; Future  -     Vitamin D 25-Hydroxy,Total (for eval of Vitamin D levels); Future  -     TSH with reflex to Free T4 if abnormal; Future  Obesity, morbid (Multi)  -     nystatin (Mycostatin) cream; Apply topically 2 times a day for 14 days. apply to affected area  -     Disability Placard  Non-ST elevation myocardial infarction (NSTEMI) (Multi)  -     spironolactone (Aldactone) 25 mg tablet; Take 1 tablet (25 mg) by mouth once daily.  Other orders  -     Follow Up In Primary Care - Established  -     Follow Up In Primary Care - Medicare Annual; Future  \  In sinus rhythm, rate is controlled, given paroxysmal atrial fibrillation and is anticoagulated on the Eliquis, continues on the Plavix given history of CAD, NSTEMI.  Follows with cardiology.  Also continues on the Farxiga and she is free of heart failure symptoms.  Blood pressure is controlled on metoprolol and losartan.  She has been taking the spironolactone half tablet 1 tablet daily, refilled, on the whole tablet daily her potassium generally runs around 4.0.  So we will continue at that dose.    Subjective   HPI  78 y.o. female here for follow up.  Well and has no complaints overall.  She has no chest pain or  shortness of breath.  No anginal symptoms.  Taking medications as is.    Review of Systems   Constitutional:  Negative for appetite change, chills and fever.   Respiratory:  Negative for cough and shortness of breath.    Cardiovascular:  Negative for chest pain.   Gastrointestinal:  Negative for abdominal pain, diarrhea, nausea and vomiting.   Neurological:  Negative for headaches.   All other systems reviewed and are negative.     Objective   Vitals:    05/30/24 1452   BP: 122/80   Pulse: 83   Temp: 36.2 °C (97.2 °F)   SpO2: 99%      Body mass index is 37.3 kg/m².  Physical Exam  Vitals reviewed.   Constitutional:       General: She is not in acute distress.     Appearance: She is not toxic-appearing.   HENT:      Head: Normocephalic and atraumatic.      Mouth/Throat:      Mouth: Mucous membranes are moist.   Eyes:      Pupils: Pupils are equal, round, and reactive to light.   Cardiovascular:      Rate and Rhythm: Normal rate and regular rhythm.      Heart sounds: No murmur heard.  Pulmonary:      Breath sounds: Normal breath sounds. No wheezing, rhonchi or rales.   Abdominal:      General: There is no distension.      Palpations: Abdomen is soft.   Musculoskeletal:      Right lower leg: No edema.      Left lower leg: No edema.   Neurological:      General: No focal deficit present.      Mental Status: She is alert and oriented to person, place, and time.       Diagnostic Results   Lab Results   Component Value Date    GLUCOSE 95 11/21/2023    CALCIUM 9.6 11/21/2023     11/21/2023    K 4.0 11/21/2023    CO2 28 11/21/2023     11/21/2023    BUN 18 11/21/2023    CREATININE 0.92 11/21/2023     Lab Results   Component Value Date    ALT 21 11/21/2023    AST 21 11/21/2023    ALKPHOS 57 11/21/2023    BILITOT 0.8 11/21/2023     Lab Results   Component Value Date    WBC 6.7 11/21/2023    HGB 14.2 11/21/2023    HCT 44.9 11/21/2023    MCV 93 11/21/2023     11/21/2023     Lab Results   Component Value Date     "CHOL 118 11/21/2023    CHOL 95 02/10/2023    CHOL 154 11/14/2022     Lab Results   Component Value Date    HDL 51.8 11/21/2023    HDL 26.0 (A) 02/10/2023    HDL 58 11/14/2022     Lab Results   Component Value Date    LDLCALC 52 11/21/2023    LDLCALC 82 11/14/2022    LDLCALC 88 11/29/2021     Lab Results   Component Value Date    TRIG 71 11/21/2023    TRIG 91 02/10/2023    TRIG 68 11/14/2022     No components found for: \"CHOLHDL\"  Lab Results   Component Value Date    HGBA1C 5.4 11/21/2023     Other labs not included in the list above were reviewed either before or during this encounter.    History    Past Medical History:   Diagnosis Date    A-fib (Multi)     Bleeding disorder (CMS-HCC)     Coronary artery disease     Encounter for gynecological examination (general) (routine) without abnormal findings     Encounter for gynecological examination without abnormal finding    Encounter for screening mammogram for malignant neoplasm of breast     Visit for screening mammogram    Hypertension     Other specified disorders of eustachian tube, bilateral 08/31/2021    Dysfunction of both eustachian tubes    Personal history of other diseases of the musculoskeletal system and connective tissue     History of osteopenia     Past Surgical History:   Procedure Laterality Date    CARDIAC SURGERY      COLONOSCOPY  05/14/2013    Complete Colonoscopy     Family History   Problem Relation Name Age of Onset    Heart disease Mother      Asthma Mother      Diabetes Mother      Heart disease Father      Diabetes Father      No Known Problems Son       Social History     Socioeconomic History    Marital status:      Spouse name: Not on file    Number of children: Not on file    Years of education: Not on file    Highest education level: Not on file   Occupational History    Not on file   Tobacco Use    Smoking status: Former     Types: Cigarettes    Smokeless tobacco: Not on file   Substance and Sexual Activity    Alcohol use: " Never    Drug use: Never    Sexual activity: Not on file   Other Topics Concern    Not on file   Social History Narrative    Not on file     Social Determinants of Health     Financial Resource Strain: Not on file   Food Insecurity: Not on file   Transportation Needs: Not on file   Physical Activity: Not on file   Stress: Not on file   Social Connections: Not on file   Intimate Partner Violence: Not on file   Housing Stability: Not on file     Allergies   Allergen Reactions    Shellfish Containing Products Anaphylaxis    Ciprofloxacin Other     vomiting    Erythromycin Base Unknown    Penicillins Hives     Current Outpatient Medications on File Prior to Visit   Medication Sig Dispense Refill    apixaban (Eliquis) 5 mg tablet Take 1 tablet (5 mg) by mouth 2 times a day. 180 tablet 3    cholecalciferol (Vitamin D3) 50 mcg (2,000 unit) capsule Take 1 capsule (50 mcg) by mouth once daily.      clopidogrel (Plavix) 75 mg tablet Take 1 tablet (75 mg) by mouth once daily. 90 tablet 3    dapagliflozin propanediol (Farxiga) 5 mg Take 1 tablet (5 mg) by mouth once daily. 30 tablet 11    fluticasone (Flonase) 50 mcg/actuation nasal spray Administer 1 spray into each nostril once daily as needed for rhinitis. 48 g 3    losartan (Cozaar) 100 mg tablet Take 1 tablet (100 mg) by mouth once daily. 90 tablet 3    metoprolol succinate XL (Toprol-XL) 50 mg 24 hr tablet Take 1 tablet (50 mg) by mouth once daily.      nystatin (Mycostatin) 100,000 unit/gram powder Apply 1 Application topically if needed for itching.      [DISCONTINUED] spironolactone (Aldactone) 25 mg tablet Take 1 tablet (25 mg) by mouth once daily. 90 tablet 3    atorvastatin (Lipitor) 40 mg tablet Take 1 tablet (40 mg) by mouth once daily. 90 tablet 3    [DISCONTINUED] spironolactone (Aldactone) 25 mg tablet Take 0.5 tablets (12.5 mg) by mouth once daily. (Patient not taking: Reported on 5/30/2024) 45 tablet 3     No current facility-administered medications on file  prior to visit.     Immunization History   Administered Date(s) Administered    Flu vaccine (IIV4), preservative free *Check age/dose* 10/19/2022    Flu vaccine, quadrivalent, high-dose, preservative free, age 65y+ (FLUZONE) 09/21/2020, 10/14/2022    Influenza, High Dose Seasonal, Preservative Free 09/23/2017, 09/12/2018, 09/23/2019, 10/07/2019    Influenza, Seasonal, Quadrivalent, Adjuvanted 09/17/2021    Influenza, Unspecified 11/17/2009, 10/12/2010, 09/16/2013    Influenza, injectable, quadrivalent 09/13/2021    Influenza, seasonal, injectable 09/13/2011, 09/11/2012, 09/22/2014, 09/08/2015, 09/08/2016, 09/14/2016    Moderna SARS-CoV-2 Vaccination 03/18/2021    Pfizer COVID-19 vaccine, bivalent, age 12 years and older (30 mcg/0.3 mL) 06/08/2023    Pfizer Purple Cap SARS-CoV-2 02/25/2021, 11/09/2021    Pneumococcal conjugate vaccine, 13-valent (PREVNAR 13) 10/01/2012, 01/26/2015, 09/12/2018, 10/07/2019    Pneumococcal polysaccharide vaccine, 23-valent, age 2 years and older (PNEUMOVAX 23) 10/12/2010, 12/12/2010, 09/23/2019    Tdap vaccine, age 7 year and older (BOOSTRIX, ADACEL) 02/28/2019    Zoster vaccine, recombinant, adult (SHINGRIX) 02/28/2019, 05/16/2019     Patient's medical history was reviewed and updated either before or during this encounter.       Fernando Dallas MD

## 2024-05-30 ENCOUNTER — OFFICE VISIT (OUTPATIENT)
Dept: PRIMARY CARE | Facility: CLINIC | Age: 79
End: 2024-05-30
Payer: MEDICARE

## 2024-05-30 VITALS
OXYGEN SATURATION: 99 % | DIASTOLIC BLOOD PRESSURE: 80 MMHG | WEIGHT: 191 LBS | SYSTOLIC BLOOD PRESSURE: 122 MMHG | HEART RATE: 83 BPM | BODY MASS INDEX: 37.5 KG/M2 | HEIGHT: 60 IN | TEMPERATURE: 97.2 F

## 2024-05-30 DIAGNOSIS — Z01.89 ENCOUNTER FOR ROUTINE LABORATORY TESTING: ICD-10-CM

## 2024-05-30 DIAGNOSIS — E78.5 DYSLIPIDEMIA: ICD-10-CM

## 2024-05-30 DIAGNOSIS — E66.01 OBESITY, MORBID (MULTI): ICD-10-CM

## 2024-05-30 DIAGNOSIS — E55.9 VITAMIN D DEFICIENCY: ICD-10-CM

## 2024-05-30 DIAGNOSIS — I48.91 ATRIAL FIBRILLATION WITH RVR (MULTI): Primary | ICD-10-CM

## 2024-05-30 DIAGNOSIS — R73.9 HYPERGLYCEMIA: ICD-10-CM

## 2024-05-30 DIAGNOSIS — E78.2 MIXED HYPERLIPIDEMIA: ICD-10-CM

## 2024-05-30 DIAGNOSIS — I21.4 NON-ST ELEVATION MYOCARDIAL INFARCTION (NSTEMI) (MULTI): ICD-10-CM

## 2024-05-30 PROBLEM — M46.1 SACROILIITIS, NOT ELSEWHERE CLASSIFIED (CMS-HCC): Status: RESOLVED | Noted: 2023-08-31 | Resolved: 2024-05-30

## 2024-05-30 PROCEDURE — 1159F MED LIST DOCD IN RCRD: CPT | Performed by: INTERNAL MEDICINE

## 2024-05-30 PROCEDURE — 99213 OFFICE O/P EST LOW 20 MIN: CPT | Performed by: INTERNAL MEDICINE

## 2024-05-30 PROCEDURE — 1126F AMNT PAIN NOTED NONE PRSNT: CPT | Performed by: INTERNAL MEDICINE

## 2024-05-30 PROCEDURE — 1160F RVW MEDS BY RX/DR IN RCRD: CPT | Performed by: INTERNAL MEDICINE

## 2024-05-30 RX ORDER — SPIRONOLACTONE 25 MG/1
25 TABLET ORAL DAILY
Qty: 90 TABLET | Refills: 3 | Status: SHIPPED | OUTPATIENT
Start: 2024-05-30 | End: 2025-05-30

## 2024-05-30 RX ORDER — NYSTATIN 100000 U/G
CREAM TOPICAL 2 TIMES DAILY
Qty: 30 G | Refills: 3 | Status: SHIPPED | OUTPATIENT
Start: 2024-05-30 | End: 2024-06-13

## 2024-05-30 ASSESSMENT — LIFESTYLE VARIABLES
HOW OFTEN DURING THE LAST YEAR HAVE YOU HAD A FEELING OF GUILT OR REMORSE AFTER DRINKING: NEVER
HAVE YOU OR SOMEONE ELSE BEEN INJURED AS A RESULT OF YOUR DRINKING: NO
SKIP TO QUESTIONS 9-10: 1
HOW OFTEN DO YOU HAVE A DRINK CONTAINING ALCOHOL: NEVER
HOW OFTEN DURING THE LAST YEAR HAVE YOU FAILED TO DO WHAT WAS NORMALLY EXPECTED FROM YOU BECAUSE OF DRINKING: NEVER
HOW OFTEN DURING THE LAST YEAR HAVE YOU NEEDED AN ALCOHOLIC DRINK FIRST THING IN THE MORNING TO GET YOURSELF GOING AFTER A NIGHT OF HEAVY DRINKING: NEVER
HOW MANY STANDARD DRINKS CONTAINING ALCOHOL DO YOU HAVE ON A TYPICAL DAY: PATIENT DOES NOT DRINK
HAS A RELATIVE, FRIEND, DOCTOR, OR ANOTHER HEALTH PROFESSIONAL EXPRESSED CONCERN ABOUT YOUR DRINKING OR SUGGESTED YOU CUT DOWN: NO
HOW OFTEN DO YOU HAVE SIX OR MORE DRINKS ON ONE OCCASION: NEVER
AUDIT-C TOTAL SCORE: 0
HOW OFTEN DURING THE LAST YEAR HAVE YOU BEEN UNABLE TO REMEMBER WHAT HAPPENED THE NIGHT BEFORE BECAUSE YOU HAD BEEN DRINKING: NEVER
AUDIT TOTAL SCORE: 0
HOW OFTEN DURING THE LAST YEAR HAVE YOU FOUND THAT YOU WERE NOT ABLE TO STOP DRINKING ONCE YOU HAD STARTED: NEVER

## 2024-05-30 ASSESSMENT — ENCOUNTER SYMPTOMS
DEPRESSION: 0
LOSS OF SENSATION IN FEET: 0
OCCASIONAL FEELINGS OF UNSTEADINESS: 0

## 2024-05-30 ASSESSMENT — PATIENT HEALTH QUESTIONNAIRE - PHQ9
SUM OF ALL RESPONSES TO PHQ9 QUESTIONS 1 AND 2: 0
2. FEELING DOWN, DEPRESSED OR HOPELESS: NOT AT ALL
1. LITTLE INTEREST OR PLEASURE IN DOING THINGS: NOT AT ALL

## 2024-05-30 ASSESSMENT — PAIN SCALES - GENERAL: PAINLEVEL: 0-NO PAIN

## 2024-07-08 PROCEDURE — RXMED WILLOW AMBULATORY MEDICATION CHARGE

## 2024-07-11 ENCOUNTER — PHARMACY VISIT (OUTPATIENT)
Dept: PHARMACY | Facility: CLINIC | Age: 79
End: 2024-07-11
Payer: COMMERCIAL

## 2024-07-30 ENCOUNTER — TELEPHONE (OUTPATIENT)
Dept: PRIMARY CARE | Facility: CLINIC | Age: 79
End: 2024-07-30
Payer: MEDICARE

## 2024-07-30 NOTE — TELEPHONE ENCOUNTER
Pt is having a tooth pulled 8/8 due to it being infected.  They have her taking clindamycin x 7 days.  She is supposed to stop taking blood thinners 3 days before the procedure.  When can she resume the blood thinners?     [phone]

## 2024-08-06 DIAGNOSIS — I48.91 ATRIAL FIBRILLATION WITH RVR (MULTI): Primary | ICD-10-CM

## 2024-08-06 RX ORDER — METOPROLOL SUCCINATE 50 MG/1
50 TABLET, EXTENDED RELEASE ORAL DAILY
Qty: 90 TABLET | Refills: 2 | Status: SHIPPED | OUTPATIENT
Start: 2024-08-06

## 2024-08-06 NOTE — TELEPHONE ENCOUNTER
LV 5/30/24, NV 12/5/24    Metoprolol succinate XL 50mg     [facility] [address] [facility]

## 2024-10-04 ENCOUNTER — PHARMACY VISIT (OUTPATIENT)
Dept: PHARMACY | Facility: CLINIC | Age: 79
End: 2024-10-04
Payer: COMMERCIAL

## 2024-10-04 PROCEDURE — RXMED WILLOW AMBULATORY MEDICATION CHARGE

## 2024-10-05 PROCEDURE — RXMED WILLOW AMBULATORY MEDICATION CHARGE

## 2024-10-07 ENCOUNTER — PHARMACY VISIT (OUTPATIENT)
Dept: PHARMACY | Facility: CLINIC | Age: 79
End: 2024-10-07
Payer: COMMERCIAL

## 2024-10-21 ENCOUNTER — CLINICAL SUPPORT (OUTPATIENT)
Dept: URGENT CARE | Age: 79
End: 2024-10-21
Payer: MEDICARE

## 2024-10-21 VITALS
SYSTOLIC BLOOD PRESSURE: 186 MMHG | BODY MASS INDEX: 37.61 KG/M2 | HEIGHT: 60 IN | DIASTOLIC BLOOD PRESSURE: 79 MMHG | HEART RATE: 79 BPM | OXYGEN SATURATION: 97 % | RESPIRATION RATE: 14 BRPM | TEMPERATURE: 97.8 F | WEIGHT: 191.58 LBS

## 2024-10-21 DIAGNOSIS — H65.02 NON-RECURRENT ACUTE SEROUS OTITIS MEDIA OF LEFT EAR: Primary | ICD-10-CM

## 2024-10-21 PROCEDURE — 99213 OFFICE O/P EST LOW 20 MIN: CPT

## 2024-10-21 RX ORDER — DOXYCYCLINE 100 MG/1
100 CAPSULE ORAL 2 TIMES DAILY
Qty: 14 CAPSULE | Refills: 0 | Status: SHIPPED | OUTPATIENT
Start: 2024-10-21 | End: 2024-10-28

## 2024-10-21 ASSESSMENT — ENCOUNTER SYMPTOMS
DIZZINESS: 0
COUGH: 0
CHEST TIGHTNESS: 0
FEVER: 0
ABDOMINAL PAIN: 0
SHORTNESS OF BREATH: 0
VOMITING: 0
EYE DISCHARGE: 0
SORE THROAT: 0
CHILLS: 0
EYE PAIN: 0
HEADACHES: 0
FATIGUE: 0

## 2024-10-21 NOTE — PROGRESS NOTES
Subjective   Patient ID: Laurence Andino is a 79 y.o. female. They present today with a chief complaint of Earache (LEFT ear pain x 1 day).    History of Present Illness  Patient is an 79-year-old female presenting to the clinic with complaints of ear pain over the left ear.  Patient states the ear pain is been present since this morning.  Patient states he has prior history of infections.  Patient denies any drainage.  Patient denies any other acute ROS at this time.  Patient requesting evaluation for ear infection.      History provided by:  Patient  Earache   Pertinent negatives include no abdominal pain, coughing, headaches, rash, sore throat or vomiting.       Past Medical History  Allergies as of 10/21/2024 - Reviewed 10/21/2024   Allergen Reaction Noted    Shellfish containing products Anaphylaxis 08/31/2023    Ciprofloxacin Other 08/31/2023    Erythromycin base Unknown 08/31/2023    Penicillins Hives 08/31/2023       (Not in a hospital admission)       Past Medical History:   Diagnosis Date    A-fib (Multi)     Bleeding disorder (CMS-HCC)     Coronary artery disease     Encounter for gynecological examination (general) (routine) without abnormal findings     Encounter for gynecological examination without abnormal finding    Encounter for screening mammogram for malignant neoplasm of breast     Visit for screening mammogram    Hypertension     Other specified disorders of eustachian tube, bilateral 08/31/2021    Dysfunction of both eustachian tubes    Personal history of other diseases of the musculoskeletal system and connective tissue     History of osteopenia       Past Surgical History:   Procedure Laterality Date    CARDIAC SURGERY      COLONOSCOPY  05/14/2013    Complete Colonoscopy        reports that she has quit smoking. Her smoking use included cigarettes. She does not have any smokeless tobacco history on file. She reports that she does not drink alcohol and does not use drugs.    Review of  Systems  Review of Systems   Constitutional:  Negative for chills, fatigue and fever.   HENT:  Positive for ear pain. Negative for congestion, postnasal drip and sore throat.    Eyes:  Negative for pain and discharge.   Respiratory:  Negative for cough, chest tightness and shortness of breath.    Cardiovascular:  Negative for chest pain.   Gastrointestinal:  Negative for abdominal pain and vomiting.   Skin:  Negative for rash.   Neurological:  Negative for dizziness and headaches.                                  Objective    Vitals:    10/21/24 1229   BP: (!) 186/79   Pulse: 79   Resp: 14   Temp: 36.6 °C (97.8 °F)   TempSrc: Oral   SpO2: 97%   Weight: 86.9 kg (191 lb 9.3 oz)   Height: 1.524 m (5')     No LMP recorded. Patient is postmenopausal.    Physical Exam  Vitals reviewed.   Constitutional:       General: She is not in acute distress.     Appearance: Normal appearance. She is not ill-appearing or toxic-appearing.   HENT:      Head: Normocephalic and atraumatic.      Right Ear: Tympanic membrane, ear canal and external ear normal.      Left Ear: Ear canal and external ear normal.      Ears:      Comments: Left TM with bulging and erythema. TM intact. Mastoid process normal bilaterally.     Nose: Nose normal.      Mouth/Throat:      Mouth: Mucous membranes are moist.   Cardiovascular:      Rate and Rhythm: Normal rate and regular rhythm.      Heart sounds: No murmur heard.     No friction rub. No gallop.   Pulmonary:      Effort: Pulmonary effort is normal. No respiratory distress.      Breath sounds: Normal breath sounds. No stridor. No wheezing, rhonchi or rales.   Neurological:      General: No focal deficit present.      Mental Status: She is alert and oriented to person, place, and time.         Procedures    Point of Care Test & Imaging Results from this visit  No results found for this visit on 10/21/24.   No results found.    Diagnostic study results (if any) were reviewed by [facility]  [facility].    Assessment/Plan   Allergies, medications, history, and pertinent labs/EKGs/Imaging reviewed by Joao Morel PA-C.     Medical Decision Making  Patient is an 79-year-old female presenting to the clinic with complaints of ear pain over the left ear.  Patient states the ear pain is been present since this morning.  Patient states he has prior history of infections.  Patient denies any drainage.  Patient denies any other acute ROS at this time.  Patient requesting evaluation for ear infection.  Vital signs in the clinic stable.  Physical examination as above.  Concern for likely left-sided otitis media.  Patient to be treated with doxycycline secondary to allergy to amoxicillin and azithromycin.  Please follow-up with your primary doctor in 5 to 7 days for reevaluation.  Please go to emergency room if symptoms worsen or if you develop any chest pain, shortness of breath or difficulty breathing.    Orders and Diagnoses  There are no diagnoses linked to this encounter.    Medical Admin Record      Patient disposition: Home    Electronically signed by [facility]  1:39 PM

## 2024-10-21 NOTE — PATIENT INSTRUCTIONS
Discharge instructions    Please follow up with your Primary Care Physician within the next 5-7 days.    It is important to take prescriptions as prescribed and complete all antibiotics.     If your symptoms worsen you are instructed to immediately go to the emergency room for reevaluation and further assessment.    If you develop any chest pain, SOB, or difficulty breathing you are instructed to go to the emergency room for reevaluation.    All discharge instructions will be provided and explained to the patient at discharge.    If you have any questions regarding your treatment plan please call the [facility] urgent care clinic.

## 2024-10-29 ENCOUNTER — APPOINTMENT (OUTPATIENT)
Dept: AUDIOLOGY | Facility: CLINIC | Age: 79
End: 2024-10-29
Payer: MEDICARE

## 2024-10-29 DIAGNOSIS — H90.3 SENSORINEURAL HEARING LOSS (SNHL) OF BOTH EARS: Primary | ICD-10-CM

## 2024-11-25 ENCOUNTER — LAB (OUTPATIENT)
Dept: LAB | Facility: LAB | Age: 79
End: 2024-11-25
Payer: MEDICARE

## 2024-11-25 DIAGNOSIS — R73.9 HYPERGLYCEMIA: ICD-10-CM

## 2024-11-25 DIAGNOSIS — Z01.89 ENCOUNTER FOR ROUTINE LABORATORY TESTING: ICD-10-CM

## 2024-11-25 DIAGNOSIS — E55.9 VITAMIN D DEFICIENCY: ICD-10-CM

## 2024-11-25 DIAGNOSIS — E78.5 DYSLIPIDEMIA: ICD-10-CM

## 2024-11-25 LAB
25(OH)D3 SERPL-MCNC: 42 NG/ML (ref 30–100)
ALBUMIN SERPL BCP-MCNC: 4.4 G/DL (ref 3.4–5)
ALP SERPL-CCNC: 67 U/L (ref 33–136)
ALT SERPL W P-5'-P-CCNC: 22 U/L (ref 7–45)
ANION GAP SERPL CALC-SCNC: 12 MMOL/L (ref 10–20)
AST SERPL W P-5'-P-CCNC: 22 U/L (ref 9–39)
BASOPHILS # BLD AUTO: 0.05 X10*3/UL (ref 0–0.1)
BASOPHILS NFR BLD AUTO: 0.7 %
BILIRUB SERPL-MCNC: 0.7 MG/DL (ref 0–1.2)
BUN SERPL-MCNC: 24 MG/DL (ref 6–23)
CALCIUM SERPL-MCNC: 9.9 MG/DL (ref 8.6–10.3)
CHLORIDE SERPL-SCNC: 103 MMOL/L (ref 98–107)
CHOLEST SERPL-MCNC: 105 MG/DL (ref 0–199)
CHOLESTEROL/HDL RATIO: 2.1
CO2 SERPL-SCNC: 28 MMOL/L (ref 21–32)
CREAT SERPL-MCNC: 0.88 MG/DL (ref 0.5–1.05)
EGFRCR SERPLBLD CKD-EPI 2021: 66 ML/MIN/1.73M*2
EOSINOPHIL # BLD AUTO: 0.26 X10*3/UL (ref 0–0.4)
EOSINOPHIL NFR BLD AUTO: 3.7 %
ERYTHROCYTE [DISTWIDTH] IN BLOOD BY AUTOMATED COUNT: 13 % (ref 11.5–14.5)
EST. AVERAGE GLUCOSE BLD GHB EST-MCNC: 108 MG/DL
GLUCOSE SERPL-MCNC: 98 MG/DL (ref 74–99)
HBA1C MFR BLD: 5.4 %
HCT VFR BLD AUTO: 43.7 % (ref 36–46)
HDLC SERPL-MCNC: 50.9 MG/DL
HGB BLD-MCNC: 13.9 G/DL (ref 12–16)
IMM GRANULOCYTES # BLD AUTO: 0.03 X10*3/UL (ref 0–0.5)
IMM GRANULOCYTES NFR BLD AUTO: 0.4 % (ref 0–0.9)
LDLC SERPL CALC-MCNC: 42 MG/DL
LYMPHOCYTES # BLD AUTO: 2.17 X10*3/UL (ref 0.8–3)
LYMPHOCYTES NFR BLD AUTO: 31.2 %
MCH RBC QN AUTO: 30.3 PG (ref 26–34)
MCHC RBC AUTO-ENTMCNC: 31.8 G/DL (ref 32–36)
MCV RBC AUTO: 95 FL (ref 80–100)
MONOCYTES # BLD AUTO: 0.73 X10*3/UL (ref 0.05–0.8)
MONOCYTES NFR BLD AUTO: 10.5 %
NEUTROPHILS # BLD AUTO: 3.72 X10*3/UL (ref 1.6–5.5)
NEUTROPHILS NFR BLD AUTO: 53.5 %
NON HDL CHOLESTEROL: 54 MG/DL (ref 0–149)
NRBC BLD-RTO: 0 /100 WBCS (ref 0–0)
PLATELET # BLD AUTO: 255 X10*3/UL (ref 150–450)
POTASSIUM SERPL-SCNC: 3.9 MMOL/L (ref 3.5–5.3)
PROT SERPL-MCNC: 7.3 G/DL (ref 6.4–8.2)
RBC # BLD AUTO: 4.58 X10*6/UL (ref 4–5.2)
SODIUM SERPL-SCNC: 139 MMOL/L (ref 136–145)
TRIGL SERPL-MCNC: 59 MG/DL (ref 0–149)
TSH SERPL-ACNC: 1.31 MIU/L (ref 0.44–3.98)
VLDL: 12 MG/DL (ref 0–40)
WBC # BLD AUTO: 7 X10*3/UL (ref 4.4–11.3)

## 2024-11-25 PROCEDURE — 80061 LIPID PANEL: CPT

## 2024-11-25 PROCEDURE — 83036 HEMOGLOBIN GLYCOSYLATED A1C: CPT

## 2024-11-25 PROCEDURE — 80053 COMPREHEN METABOLIC PANEL: CPT

## 2024-11-25 PROCEDURE — 82306 VITAMIN D 25 HYDROXY: CPT

## 2024-11-25 PROCEDURE — 84443 ASSAY THYROID STIM HORMONE: CPT

## 2024-11-25 PROCEDURE — 85025 COMPLETE CBC W/AUTO DIFF WBC: CPT

## 2024-11-25 NOTE — LETTER
EP catheter inserted at the bundle of His.  November 26, 2024     Laurence J Thu  [address] [facility] [address]      Dear Ms. Andino:    Below are the results from your recent visit:    Resulted Orders   CBC and Auto Differential   Result Value Ref Range    WBC 7.0 4.4 - 11.3 x10*3/uL    nRBC 0.0 0.0 - 0.0 /100 WBCs    RBC 4.58 4.00 - 5.20 x10*6/uL    Hemoglobin 13.9 12.0 - 16.0 g/dL    Hematocrit 43.7 36.0 - 46.0 %    MCV 95 80 - 100 fL    MCH 30.3 26.0 - 34.0 pg    MCHC 31.8 (L) 32.0 - 36.0 g/dL    RDW 13.0 11.5 - 14.5 %    Platelets 255 150 - 450 x10*3/uL    Neutrophils % 53.5 40.0 - 80.0 %    Immature Granulocytes %, Automated 0.4 0.0 - 0.9 %      Comment:      Immature Granulocyte Count (IG) includes promyelocytes, myelocytes and metamyelocytes but does not include bands. Percent differential counts (%) should be interpreted in the context of the absolute cell counts (cells/UL).    Lymphocytes % 31.2 13.0 - 44.0 %    Monocytes % 10.5 2.0 - 10.0 %    Eosinophils % 3.7 0.0 - 6.0 %    Basophils % 0.7 0.0 - 2.0 %    Neutrophils Absolute 3.72 1.60 - 5.50 x10*3/uL      Comment:      Percent differential counts (%) should be interpreted in the context of the absolute cell counts (cells/uL).    Immature Granulocytes Absolute, Automated 0.03 0.00 - 0.50 x10*3/uL    Lymphocytes Absolute 2.17 0.80 - 3.00 x10*3/uL    Monocytes Absolute 0.73 0.05 - 0.80 x10*3/uL    Eosinophils Absolute 0.26 0.00 - 0.40 x10*3/uL    Basophils Absolute 0.05 0.00 - 0.10 x10*3/uL   Comprehensive Metabolic Panel   Result Value Ref Range    Glucose 98 74 - 99 mg/dL    Sodium 139 136 - 145 mmol/L    Potassium 3.9 3.5 - 5.3 mmol/L    Chloride 103 98 - 107 mmol/L    Bicarbonate 28 21 - 32 mmol/L    Anion Gap 12 10 - 20 mmol/L    Urea Nitrogen 24 (H) 6 - 23 mg/dL    Creatinine 0.88 0.50 - 1.05 mg/dL    eGFR 66 >60 mL/min/1.73m*2      Comment:      Calculations of estimated GFR are performed using the 2021 CKD-EPI Study Refit equation without the race variable for the IDMS-Traceable  creatinine methods.  https://jasn.asnjournals.org/content/early/2021/09/22/ASN.8875728601    Calcium 9.9 8.6 - 10.3 mg/dL    Albumin 4.4 3.4 - 5.0 g/dL    Alkaline Phosphatase 67 33 - 136 U/L    Total Protein 7.3 6.4 - 8.2 g/dL    AST 22 9 - 39 U/L    Bilirubin, Total 0.7 0.0 - 1.2 mg/dL    ALT 22 7 - 45 U/L      Comment:      Patients treated with Sulfasalazine may generate falsely decreased results for ALT.   Lipid Panel   Result Value Ref Range    Cholesterol 105 0 - 199 mg/dL      Comment:            Age      Desirable   Borderline High   High     0-19 Y     0 - 169       170 - 199     >/= 200    20-24 Y     0 - 189       190 - 224     >/= 225         >24 Y     0 - 199       200 - 239     >/= 240   **All ranges are based on fasting samples. Specific   therapeutic targets will vary based on patient-specific   cardiac risk.    Pediatric guidelines reference:Pediatrics 2011, 128(S5).Adult guidelines reference: NCEP ATPIII Guidelines,ISSAC 2001, 258:2486-97    Venipuncture immediately after or during the administration of Metamizole may lead to falsely low results. Testing should be performed immediately prior to Metamizole dosing.    HDL-Cholesterol 50.9 mg/dL      Comment:        Age       Very Low   Low     Normal    High    0-19 Y    < 35      < 40     40-45     ----  20-24 Y    ----     < 40      >45      ----        >24 Y      ----     < 40     40-60      >60      Cholesterol/HDL Ratio 2.1       Comment:        Ref Values  Desirable  < 3.4  High Risk  > 5.0    LDL Calculated 42 <=99 mg/dL      Comment:                                  Near   Borderline      AGE      Desirable  Optimal    High     High     Very High     0-19 Y     0 - 109     ---    110-129   >/= 130     ----    20-24 Y     0 - 119     ---    120-159   >/= 160     ----      >24 Y     0 -  99   100-129  130-159   160-189     >/=190      VLDL 12 0 - 40 mg/dL    Triglycerides 59 0 - 149 mg/dL      Comment:      Age              Desirable         Borderline         High        Very High  SEX:B           mg/dL             mg/dL               mg/dL      mg/dL  <=14D                       ----               ----        ----  15D-365D                    ----               ----        ----  1Y-9Y           0-74               75-99             >=100       ----  10Y-19Y        0-89                            >=130       ----  20Y-24Y        0-114             115-149             >=150      ----  >= 25Y         0-149             150-199             200-499    >=500      Venipuncture immediately after or during the administration of Metamizole may lead to falsely low results. Testing should be performed immediately prior to Metamizole dosing.    Non HDL Cholesterol 54 0 - 149 mg/dL      Comment:            Age       Desirable   Borderline High   High     Very High     0-19 Y     0 - 119       120 - 144     >/= 145    >/= 160    20-24 Y     0 - 149       150 - 189     >/= 190      ----         >24 Y    30 mg/dL above LDL Cholesterol goal     Hemoglobin A1C   Result Value Ref Range    Hemoglobin A1C 5.4 See comment %    Estimated Average Glucose 108 Not Established mg/dL    Narrative    Diagnosis of Diabetes-Adults  Non-Diabetic: < or = 5.6%  Increased risk for developing diabetes: 5.7-6.4%  Diagnostic of diabetes: > or = 6.5%       Vitamin D 25-Hydroxy,Total (for eval of Vitamin D levels)   Result Value Ref Range    Vitamin D, 25-Hydroxy, Total 42 30 - 100 ng/mL    Narrative    Deficiency:         < 20   ng/ml  Insufficiency:      20-29  ng/ml  Sufficiency:         ng/ml  This assay accurately quantifies the sum of Vitamin D3, 25-Hydroxy and Vitamin D2,25-Hydroxy.   TSH with reflex to Free T4 if abnormal   Result Value Ref Range    Thyroid Stimulating Hormone 1.31 0.44 - 3.98 mIU/L    Narrative    TSH testing is performed using different testing methodology at [facility] than at other [facility] hospitals. Direct result comparisons  should only be made within the same method.       I am happy to report that these are normal results.  Any minor variations are not clinically significant.     If you have any questions or concerns, please don't hesitate to call.         Sincerely,    Fernando Dallas MD    [facility] TECH

## 2024-12-02 ENCOUNTER — APPOINTMENT (OUTPATIENT)
Dept: PRIMARY CARE | Facility: CLINIC | Age: 79
End: 2024-12-02
Payer: MEDICARE

## 2024-12-03 ENCOUNTER — APPOINTMENT (OUTPATIENT)
Dept: PRIMARY CARE | Facility: CLINIC | Age: 79
End: 2024-12-03
Payer: MEDICARE

## 2024-12-03 ASSESSMENT — ENCOUNTER SYMPTOMS
COUGH: 0
SHORTNESS OF BREATH: 0
CHILLS: 0
DIARRHEA: 0
ABDOMINAL PAIN: 0
VOMITING: 0
APPETITE CHANGE: 0
NAUSEA: 0
HEADACHES: 0
FEVER: 0

## 2024-12-03 NOTE — PROGRESS NOTES
[facility]: [facility]  MEDICARE WELLNESS EXAM      Laurence Andino is a 79 y.o. female that is presenting today for No chief complaint on file..    Assessment/Plan    Diagnoses and all orders for this visit:  Annual physical exam  Atrial fibrillation with RVR (Multi)  Mixed hyperlipidemia  Dyslipidemia  Prediabetes    CAD - cont plavix and apixaban with PCI and paroxysmal AFib     PAF rate controlled and on AC          ADVANCED CARE PLANNING  Advanced Care Planning was discussed with patient:  The patient has an active advanced care plan on file. The patient has an active surrogate decision-maker on file.  Encouraged the patient to confirm that Living Will and Healthcare Power of  (HCPoA) are accurate and up to date.  Encouraged the patient to confirm that our office be provided a copy of any documentation in the event that anything changes.    ACTIVITIES OF DAILY LIVING  Basic ADLs:  Bathing: Independent, Dressing: Independent, Toileting: Independent, Transferring: Independent, Continence: Independent, Feeding: Independent.    Instrumental ADLs:  Ability to use phone: Independent, Shopping: Independent, Cooking: Independent, House-keeping: Independent, Laundry: Independent, Transportation: Independent, Medication Management: Independent, Finance Management: Independent.    Subjective   HPI  This patient presents today for annual physical, Medicare wellness exam.  Discussed screening/prevention, healthy lifestyle and code status.   Reviewed the patient's wishes regarding decision making.    Consultant visits and notes reviewed: Cardio [facility]    Stable from a functional standpoint in regard to ADLs and IADLs.  No recent falls are reported.    The patient denies chest pain and shortness of breath.  No exertion-provoked or anginal-type symptoms are reported.    Review of Systems   Constitutional:  Negative for appetite change, chills and fever.   Respiratory:  Negative for cough and  shortness of breath.    Cardiovascular:  Negative for chest pain.   Gastrointestinal:  Negative for abdominal pain, diarrhea, nausea and vomiting.   Neurological:  Negative for headaches.   All other systems reviewed and are negative.    Objective   There were no vitals filed for this visit.   There is no height or weight on file to calculate BMI.  Physical Exam  Vitals reviewed.   Constitutional:       General: She is not in acute distress.     Appearance: She is not toxic-appearing.   HENT:      Head: Normocephalic and atraumatic.      Mouth/Throat:      Mouth: Mucous membranes are moist.   Eyes:      Pupils: Pupils are equal, round, and reactive to light.   Cardiovascular:      Rate and Rhythm: Normal rate and regular rhythm.      Heart sounds: No murmur heard.  Pulmonary:      Breath sounds: Normal breath sounds. No wheezing, rhonchi or rales.   Abdominal:      General: There is no distension.      Palpations: Abdomen is soft.   Musculoskeletal:      Right lower leg: No edema.      Left lower leg: No edema.   Neurological:      General: No focal deficit present.      Mental Status: She is alert and oriented to person, place, and time.       Diagnostic Results   Lab Results   Component Value Date    GLUCOSE 98 11/25/2024    CALCIUM 9.9 11/25/2024     11/25/2024    K 3.9 11/25/2024    CO2 28 11/25/2024     11/25/2024    BUN 24 (H) 11/25/2024    CREATININE 0.88 11/25/2024     Lab Results   Component Value Date    ALT 22 11/25/2024    AST 22 11/25/2024    ALKPHOS 67 11/25/2024    BILITOT 0.7 11/25/2024     Lab Results   Component Value Date    WBC 7.0 11/25/2024    HGB 13.9 11/25/2024    HCT 43.7 11/25/2024    MCV 95 11/25/2024     11/25/2024     Lab Results   Component Value Date    CHOL 105 11/25/2024    CHOL 118 11/21/2023    CHOL 95 02/10/2023     Lab Results   Component Value Date    HDL 50.9 11/25/2024    HDL 51.8 11/21/2023    HDL 26.0 (A) 02/10/2023     Lab Results   Component Value Date  "   LDLCALC 42 11/25/2024    LDLCALC 52 11/21/2023    LDLCALC 82 11/14/2022     Lab Results   Component Value Date    TRIG 59 11/25/2024    TRIG 71 11/21/2023    TRIG 91 02/10/2023     No components found for: \"CHOLHDL\"  Lab Results   Component Value Date    HGBA1C 5.4 11/25/2024     Other labs not included in the list above reviewed either before or during this encounter.    History   Past Medical History:   Diagnosis Date    A-fib (Multi)     Bleeding disorder (CMS-HCC)     Coronary artery disease     Encounter for gynecological examination (general) (routine) without abnormal findings     Encounter for gynecological examination without abnormal finding    Encounter for screening mammogram for malignant neoplasm of breast     Visit for screening mammogram    Hypertension     Other specified disorders of eustachian tube, bilateral 08/31/2021    Dysfunction of both eustachian tubes    Personal history of other diseases of the musculoskeletal system and connective tissue     History of osteopenia     Past Surgical History:   Procedure Laterality Date    CARDIAC SURGERY      COLONOSCOPY  05/14/2013    Complete Colonoscopy     Family History   Problem Relation Name Age of Onset    Heart disease Mother      Asthma Mother      Diabetes Mother      Heart disease Father      Diabetes Father      No Known Problems Son       Social History     Socioeconomic History    Marital status:      Spouse name: Not on file    Number of children: Not on file    Years of education: Not on file    Highest education level: Not on file   Occupational History    Not on file   Tobacco Use    Smoking status: Former     Types: Cigarettes    Smokeless tobacco: Not on file   Substance and Sexual Activity    Alcohol use: Never    Drug use: Never    Sexual activity: Not on file   Other Topics Concern    Not on file   Social History Narrative    Not on file     Social Drivers of Health     Financial Resource Strain: Not on file   Food " Insecurity: Not on file   Transportation Needs: Not on file   Physical Activity: Not on file   Stress: Not on file   Social Connections: Not on file   Intimate Partner Violence: Not on file   Housing Stability: Not on file     Allergies   Allergen Reactions    Shellfish Containing Products Anaphylaxis    Ciprofloxacin Other     vomiting    Erythromycin Base Unknown    Penicillins Hives     Current Outpatient Medications on File Prior to Visit   Medication Sig Dispense Refill    apixaban (Eliquis) 5 mg tablet Take 1 tablet (5 mg) by mouth 2 times a day. 180 tablet 3    atorvastatin (Lipitor) 40 mg tablet Take 1 tablet (40 mg) by mouth once daily. 90 tablet 3    cholecalciferol (Vitamin D3) 50 mcg (2,000 unit) capsule Take 1 capsule (50 mcg) by mouth once daily.      clopidogrel (Plavix) 75 mg tablet Take 1 tablet (75 mg) by mouth once daily. 90 tablet 3    dapagliflozin propanediol (Farxiga) 5 mg Take 1 tablet (5 mg) by mouth once daily. 30 tablet 11    fluticasone (Flonase) 50 mcg/actuation nasal spray Administer 1 spray into each nostril once daily as needed for rhinitis. 48 g 3    losartan (Cozaar) 100 mg tablet Take 1 tablet (100 mg) by mouth once daily. 90 tablet 3    metoprolol succinate XL (Toprol-XL) 50 mg 24 hr tablet Take 1 tablet (50 mg) by mouth once daily. 90 tablet 2    nystatin (Mycostatin) 100,000 unit/gram powder Apply 1 Application topically if needed for itching.      spironolactone (Aldactone) 25 mg tablet Take 1 tablet (25 mg) by mouth once daily. 90 tablet 3     No current facility-administered medications on file prior to visit.     Immunization History   Administered Date(s) Administered    Flu vaccine (IIV4), preservative free *Check age/dose* 10/19/2022    Flu vaccine, quadrivalent, high-dose, preservative free, age 65y+ (FLUZONE) 09/21/2020, 10/14/2022    Flu vaccine, trivalent, preservative free, HIGH-DOSE, age 65y+ (Fluzone) 09/23/2017, 09/12/2018, 09/23/2019, 10/07/2019, 09/30/2024     Influenza, Seasonal, Quadrivalent, Adjuvanted 09/17/2021    Influenza, Unspecified 11/17/2009, 10/12/2010, 09/16/2013    Influenza, injectable, quadrivalent 09/13/2021    Influenza, seasonal, injectable 09/13/2011, 09/11/2012, 09/22/2014, 09/08/2015, 09/08/2016, 09/14/2016    Moderna SARS-CoV-2 Vaccination 03/18/2021    Pfizer COVID-19 vaccine, 12 years and older, (30mcg/0.3mL) (Comirnaty) 09/30/2024    Pfizer COVID-19 vaccine, bivalent, age 12 years and older (30 mcg/0.3 mL) 06/08/2023    Pfizer Purple Cap SARS-CoV-2 02/25/2021, 11/09/2021    Pneumococcal conjugate vaccine, 13-valent (PREVNAR 13) 10/01/2012, 01/26/2015, 09/12/2018, 10/07/2019    Pneumococcal polysaccharide vaccine, 23-valent, age 2 years and older (PNEUMOVAX 23) 10/12/2010, 12/12/2010, 09/23/2019    Tdap vaccine, age 7 year and older (BOOSTRIX, ADACEL) 02/28/2019    Zoster vaccine, recombinant, adult (SHINGRIX) 02/28/2019, 05/16/2019     Patient's medical history was reviewed and updated either before or during this encounter.     Fernando Dallas MD

## 2024-12-04 ENCOUNTER — TELEPHONE (OUTPATIENT)
Dept: PRIMARY CARE | Facility: CLINIC | Age: 79
End: 2024-12-04
Payer: MEDICARE

## 2024-12-04 DIAGNOSIS — M54.9 BACK PAIN, UNSPECIFIED BACK LOCATION, UNSPECIFIED BACK PAIN LATERALITY, UNSPECIFIED CHRONICITY: ICD-10-CM

## 2024-12-04 DIAGNOSIS — Z12.31 ENCOUNTER FOR SCREENING MAMMOGRAM FOR BREAST CANCER: Primary | ICD-10-CM

## 2024-12-04 NOTE — TELEPHONE ENCOUNTER
Pt is asking for a mammogram order and would like the PT order and the mammogram order mailed to pt

## 2024-12-05 ENCOUNTER — OFFICE VISIT (OUTPATIENT)
Dept: PRIMARY CARE | Facility: CLINIC | Age: 79
End: 2024-12-05
Payer: MEDICARE

## 2024-12-05 DIAGNOSIS — E78.5 DYSLIPIDEMIA: ICD-10-CM

## 2024-12-05 DIAGNOSIS — R73.9 HYPERGLYCEMIA: ICD-10-CM

## 2024-12-05 DIAGNOSIS — E55.9 VITAMIN D DEFICIENCY: ICD-10-CM

## 2024-12-05 DIAGNOSIS — R73.03 PREDIABETES: ICD-10-CM

## 2024-12-05 DIAGNOSIS — E78.2 MIXED HYPERLIPIDEMIA: ICD-10-CM

## 2024-12-05 DIAGNOSIS — I48.91 ATRIAL FIBRILLATION WITH RVR (MULTI): ICD-10-CM

## 2024-12-05 DIAGNOSIS — Z00.00 ANNUAL PHYSICAL EXAM: Primary | ICD-10-CM

## 2024-12-17 ENCOUNTER — OFFICE VISIT (OUTPATIENT)
Dept: URGENT CARE | Age: 79
End: 2024-12-17
Payer: MEDICARE

## 2024-12-17 VITALS
WEIGHT: 192.02 LBS | BODY MASS INDEX: 37.5 KG/M2 | TEMPERATURE: 98 F | SYSTOLIC BLOOD PRESSURE: 142 MMHG | DIASTOLIC BLOOD PRESSURE: 82 MMHG | OXYGEN SATURATION: 96 % | HEART RATE: 82 BPM | RESPIRATION RATE: 16 BRPM

## 2024-12-17 DIAGNOSIS — R30.0 BURNING WITH URINATION: ICD-10-CM

## 2024-12-17 DIAGNOSIS — N30.01 ACUTE CYSTITIS WITH HEMATURIA: Primary | ICD-10-CM

## 2024-12-17 DIAGNOSIS — R39.15 URGENCY OF URINATION: ICD-10-CM

## 2024-12-17 LAB
POC APPEARANCE, URINE: CLEAR
POC BILIRUBIN, URINE: NEGATIVE
POC BLOOD, URINE: ABNORMAL
POC COLOR, URINE: YELLOW
POC GLUCOSE, URINE: ABNORMAL MG/DL
POC KETONES, URINE: NEGATIVE MG/DL
POC LEUKOCYTES, URINE: NEGATIVE
POC NITRITE,URINE: POSITIVE
POC PH, URINE: 6 PH
POC PROTEIN, URINE: NEGATIVE MG/DL
POC SPECIFIC GRAVITY, URINE: 1.02
POC UROBILINOGEN, URINE: 0.2 EU/DL

## 2024-12-17 PROCEDURE — 87086 URINE CULTURE/COLONY COUNT: CPT

## 2024-12-17 PROCEDURE — 99213 OFFICE O/P EST LOW 20 MIN: CPT

## 2024-12-17 PROCEDURE — 1160F RVW MEDS BY RX/DR IN RCRD: CPT

## 2024-12-17 PROCEDURE — 87186 SC STD MICRODIL/AGAR DIL: CPT

## 2024-12-17 PROCEDURE — 1159F MED LIST DOCD IN RCRD: CPT

## 2024-12-17 PROCEDURE — 81003 URINALYSIS AUTO W/O SCOPE: CPT

## 2024-12-17 RX ORDER — NITROFURANTOIN 25; 75 MG/1; MG/1
100 CAPSULE ORAL 2 TIMES DAILY
Qty: 10 CAPSULE | Refills: 0 | Status: SHIPPED | OUTPATIENT
Start: 2024-12-17 | End: 2024-12-22

## 2024-12-17 ASSESSMENT — ENCOUNTER SYMPTOMS
VOMITING: 0
FEVER: 0
NAUSEA: 0
DIZZINESS: 0
FREQUENCY: 0
SHORTNESS OF BREATH: 0
ABDOMINAL PAIN: 0
ABDOMINAL DISTENTION: 0
SORE THROAT: 0
HEMATURIA: 0
FLANK PAIN: 0
COUGH: 0
WHEEZING: 0
FATIGUE: 0
BACK PAIN: 0
CHEST TIGHTNESS: 0
CONFUSION: 0
CHILLS: 0
DIARRHEA: 0
WEAKNESS: 0
DYSURIA: 1

## 2024-12-17 NOTE — PATIENT INSTRUCTIONS
Discharge instructions    Please follow up with your Primary Care Physician within the next 5-7 days.    Pending urine culture in 3 to 4 days.    If you develop any fever, chills, sweats, nausea, vomiting, abdominal pain or back pain you must immediately be reevaluated in the emergency room.    It is important to take prescriptions as prescribed and complete all antibiotics.     If your symptoms worsen you are instructed to immediately go to the emergency room for reevaluation and further assessment.    If you develop any chest pain, SOB, or difficulty breathing you are instructed to go to the emergency room for reevaluation.    All discharge instructions will be provided and explained to the patient at discharge.    If you have any questions regarding your treatment plan please call the [facility] urgent care clinic.

## 2024-12-17 NOTE — PROGRESS NOTES
Subjective   Patient ID: Laurence Andino is a 79 y.o. female. They present today with a chief complaint of Burning with Urination  (3 days ) and Urgency to Urinate  (3 days ).    History of Present Illness  Patient is an 79-year-old female presenting to the clinic with concerns of urinary tract infection.  Patient states she has had symptoms of dysuria and urgency for the last 3 days.  Patient denies any abdominal pain, fever, chills, sweats, back pain, nausea or vomiting, fever or chills.  Only symptoms include burning with urination increased urinary urgency.  Patient states also sometimes she pees and not a whole lot comes out.  Patient states she also notices an odor.  CRCL 69 one month ago.      History provided by:  Patient      Past Medical History  Allergies as of 12/17/2024 - Reviewed 12/17/2024   Allergen Reaction Noted    Shellfish containing products Anaphylaxis 08/31/2023    Ciprofloxacin Other 08/31/2023    Erythromycin base Unknown 08/31/2023    Penicillins Hives 08/31/2023       (Not in a hospital admission)       Past Medical History:   Diagnosis Date    A-fib (Multi)     Bleeding disorder (CMS-HCC)     Coronary artery disease     Encounter for gynecological examination (general) (routine) without abnormal findings     Encounter for gynecological examination without abnormal finding    Encounter for screening mammogram for malignant neoplasm of breast     Visit for screening mammogram    Hypertension     Other specified disorders of eustachian tube, bilateral 08/31/2021    Dysfunction of both eustachian tubes    Personal history of other diseases of the musculoskeletal system and connective tissue     History of osteopenia       Past Surgical History:   Procedure Laterality Date    CARDIAC SURGERY      COLONOSCOPY  05/14/2013    Complete Colonoscopy        reports that she has quit smoking. Her smoking use included cigarettes. She does not have any smokeless tobacco history on file. She reports that  she does not drink alcohol and does not use drugs.    Review of Systems  Review of Systems   Constitutional:  Negative for chills, fatigue and fever.   HENT:  Negative for sore throat.    Respiratory:  Negative for cough, chest tightness, shortness of breath and wheezing.    Cardiovascular:  Negative for chest pain.   Gastrointestinal:  Negative for abdominal distention, abdominal pain, diarrhea, nausea and vomiting.   Genitourinary:  Positive for dysuria and urgency. Negative for decreased urine volume, flank pain, frequency and hematuria.   Musculoskeletal:  Negative for back pain.   Skin:  Negative for rash.   Neurological:  Negative for dizziness and weakness.   Psychiatric/Behavioral:  Negative for confusion.                                   Objective    Vitals:    12/17/24 0922   BP: 142/82   BP Location: Left arm   Patient Position: Sitting   Pulse: 82   Resp: 16   Temp: 36.7 °C (98 °F)   TempSrc: Oral   SpO2: 96%   Weight: 87.1 kg (192 lb 0.3 oz)     No LMP recorded. Patient is postmenopausal.    Physical Exam  Vitals reviewed.   Constitutional:       General: She is awake.      Appearance: Normal appearance. She is well-developed and well-groomed. She is not ill-appearing, toxic-appearing or diaphoretic.   HENT:      Head: Normocephalic and atraumatic.   Cardiovascular:      Rate and Rhythm: Normal rate and regular rhythm.      Heart sounds: Normal heart sounds, S1 normal and S2 normal. Heart sounds not distant. No murmur heard.     No friction rub. No gallop.   Pulmonary:      Effort: Pulmonary effort is normal. No accessory muscle usage, prolonged expiration or respiratory distress.      Breath sounds: Normal breath sounds and air entry. No stridor or decreased air movement. No decreased breath sounds, wheezing, rhonchi or rales.   Abdominal:      General: Abdomen is flat. There is no distension.      Palpations: Abdomen is soft.      Tenderness: There is no abdominal tenderness. There is no right CVA  tenderness or left CVA tenderness.   Skin:     General: Skin is warm.   Neurological:      General: No focal deficit present.      Mental Status: She is alert and oriented to person, place, and time.   Psychiatric:         Behavior: Behavior is cooperative.         Procedures    Point of Care Test & Imaging Results from this visit  Results for orders placed or performed in visit on 12/17/24   POCT UA Automated manually resulted   Result Value Ref Range    POC Color, Urine Yellow Straw, Yellow, Light-Yellow    POC Appearance, Urine Clear Clear    POC Glucose, Urine 500 (3+) (A) NEGATIVE mg/dl    POC Bilirubin, Urine NEGATIVE NEGATIVE    POC Ketones, Urine NEGATIVE NEGATIVE mg/dl    POC Specific Gravity, Urine 1.020 1.005 - 1.035    POC Blood, Urine LARGE (3+) (A) NEGATIVE    POC PH, Urine 6.0 No Reference Range Established PH    POC Protein, Urine NEGATIVE NEGATIVE, 30 (1+) mg/dl    POC Urobilinogen, Urine 0.2 0.2, 1.0 EU/DL    Poc Nitrite, Urine POSITIVE (A) NEGATIVE    POC Leukocytes, Urine NEGATIVE NEGATIVE      No results found.    Diagnostic study results (if any) were reviewed by [facility].    Assessment/Plan   Allergies, medications, history, and pertinent labs/EKGs/Imaging reviewed by Joao Morel PA-C.     Medical Decision Making  Patient is an 79-year-old female presenting to the clinic with concerns of urinary tract infection.  Patient states she has had symptoms of dysuria and urgency for the last 3 days.  Patient denies any abdominal pain, fever, chills, sweats, back pain, nausea or vomiting, fever or chills.  Only symptoms include burning with urination increased urinary urgency.  Patient states also sometimes she pees and not a whole lot comes out.  Patient states she also notices an odor.  CRCL 69 one month ago.  Vital signs in the clinic are stable.  Physical examination as above.  Physical examination benign.  No CVA tenderness.  No abdominal pain.  No guarding or rigidity.  Abdomen  soft.  Likely acute uncomplicated urinary tract infection.  Will cover patient with Macrobid at this time.  Cannot use penicillins secondary to allergy.  Cannot use Keflex secondary to penicillin allergy.  Cannot use Bactrim secondary to interaction with medications. Discharge instructions. Please follow up with your Primary Care Physician within the next 5-7 days. Pending urine culture in 3 to 4 days. If you develop any fever, chills, sweats, nausea, vomiting, abdominal pain or back pain you must immediately be reevaluated in the emergency room. It is important to take prescriptions as prescribed and complete all antibiotics. If your symptoms worsen you are instructed to immediately go to the emergency room for reevaluation and further assessment. If you develop any chest pain, SOB, or difficulty breathing you are instructed to go to the emergency room for reevaluation. All discharge instructions will be provided and explained to the patient at discharge. If you have any questions regarding your treatment plan please call the [facility] urgent care clinic.     Orders and Diagnoses  Diagnoses and all orders for this visit:  Acute cystitis with hematuria  -     Urine Culture  -     nitrofurantoin, macrocrystal-monohydrate, (Macrobid) 100 mg capsule; Take 1 capsule (100 mg) by mouth 2 times a day for 5 days.  Urgency of urination  -     POCT UA Automated manually resulted  Burning with urination  -     POCT UA Automated manually resulted      Medical Admin Record      Patient disposition: Home    Electronically signed by [facility]  1:54 PM

## 2024-12-21 LAB — BACTERIA UR CULT: ABNORMAL

## 2024-12-31 DIAGNOSIS — I48.91 ATRIAL FIBRILLATION WITH RVR (MULTI): ICD-10-CM

## 2024-12-31 PROCEDURE — RXMED WILLOW AMBULATORY MEDICATION CHARGE

## 2025-01-02 ENCOUNTER — PHARMACY VISIT (OUTPATIENT)
Dept: PHARMACY | Facility: CLINIC | Age: 80
End: 2025-01-02
Payer: COMMERCIAL

## 2025-01-06 DIAGNOSIS — R73.03 PREDIABETES: ICD-10-CM

## 2025-01-06 DIAGNOSIS — I48.91 ATRIAL FIBRILLATION WITH RVR (MULTI): Primary | ICD-10-CM

## 2025-01-08 ENCOUNTER — TELEMEDICINE (OUTPATIENT)
Dept: PHARMACY | Facility: HOSPITAL | Age: 80
End: 2025-01-08
Payer: MEDICARE

## 2025-01-08 DIAGNOSIS — R73.03 PREDIABETES: ICD-10-CM

## 2025-01-08 DIAGNOSIS — I48.91 ATRIAL FIBRILLATION WITH RVR (MULTI): ICD-10-CM

## 2025-01-08 NOTE — PROGRESS NOTES
MEDICATION MANAGEMENT    Laurence Andino is a 79 y.o. female who was referred by Fernando Dallas MD to complete medication reconciliation and review with the clinical pharmacist.  A comprehensive medication review was completed with the patient via telephone today.  Patient reports financial barrier with current medication.      MEDICATION HISTORY  Allergies   Allergen Reactions    Shellfish Containing Products Anaphylaxis    Ciprofloxacin Other     vomiting    Erythromycin Base Unknown    Penicillins Hives     Current Outpatient Medications on File Prior to Visit   Medication Sig Dispense Refill    apixaban (Eliquis) 5 mg tablet Take 1 tablet (5 mg) by mouth 2 times a day. 180 tablet 0    atorvastatin (Lipitor) 40 mg tablet Take 1 tablet (40 mg) by mouth once daily. 90 tablet 3    cholecalciferol (Vitamin D3) 50 mcg (2,000 unit) capsule Take 1 capsule (50 mcg) by mouth once daily.      clopidogrel (Plavix) 75 mg tablet Take 1 tablet (75 mg) by mouth once daily. 90 tablet 3    dapagliflozin propanediol (Farxiga) 5 mg Take 1 tablet (5 mg) by mouth once daily. 30 tablet 11    fluticasone (Flonase) 50 mcg/actuation nasal spray Administer 1 spray into each nostril once daily as needed for rhinitis. 48 g 3    losartan (Cozaar) 100 mg tablet Take 1 tablet (100 mg) by mouth once daily. 90 tablet 3    metoprolol succinate XL (Toprol-XL) 50 mg 24 hr tablet Take 1 tablet (50 mg) by mouth once daily. 90 tablet 2    nystatin (Mycostatin) 100,000 unit/gram powder Apply 1 Application topically if needed for itching.      spironolactone (Aldactone) 25 mg tablet Take 1 tablet (25 mg) by mouth once daily. 90 tablet 3    vit A/vit C/vit E/zinc/copper (ICAPS AREDS ORAL) Take 1 capsule by mouth 2 times a day.       No current facility-administered medications on file prior to visit.        Patient Assistance Screening (VAF)  Patient verbally reports monthly or yearly income which is less than 400% federal poverty  level.  Application for program has been submitted for the following medications: Eliquis and Farxiga  Patient has been informed that program team will be reaching out to them to discuss necessary documentation, instructed to answer phone/return voicemail.   Patient aware this process may take up to 6 weeks.   If approved medication must be filled through [facility] pharmacy and may be picked up or mailed to patient.       LABS  There were no vitals taken for this visit.   Lab Results   Component Value Date    HGBA1C 5.4 11/25/2024    HGBA1C 5.4 11/21/2023    HGBA1C 5.1 11/14/2022     Lab Results   Component Value Date    BILITOT 0.7 11/25/2024    CALCIUM 9.9 11/25/2024    CO2 28 11/25/2024     11/25/2024    CREATININE 0.88 11/25/2024    GLUCOSE 98 11/25/2024    ALKPHOS 67 11/25/2024    K 3.9 11/25/2024    PROT 7.3 11/25/2024     11/25/2024    AST 22 11/25/2024    ALT 22 11/25/2024    BUN 24 (H) 11/25/2024    ANIONGAP 12 11/25/2024    MG 2.18 02/14/2023    PHOS 3.1 02/14/2023    ALBUMIN 4.4 11/25/2024    GFRF 78 03/08/2023     Lab Results   Component Value Date    TRIG 59 11/25/2024    CHOL 105 11/25/2024    LDLCALC 42 11/25/2024    HDL 50.9 11/25/2024         Assessment/Plan    Comments/Recommendations to PCP:    Continue Farxiga 5 mg once daily for the treatment high blood sugar (prediabetes). Will continue to monitor for side effects and blood sugars for continued treatment.   Farxiga Education:  - Counseled patient on Farxiga MOA, expectations, side effects, duration of therapy, administration, and monitoring parameters.  - Reviewed the benefits of SGLT-2i therapy, such as glycemic control and kidney and CV protection.  - Advised patient to practice proper  hygiene to reduce risk of UTIs or yeast infections.  - Advised patient to maintain adequate fluid intake to remain hydrated while on SGLT2i therapy.  - Answered all patient questions and concerns.    Continue Eliquis 5 mg twice daily for  anticoagulation in atrial fibrillation. Patient reports no side effects, no bleeding that won't stop or bruises that wont heal. Will continue to monitor for adverse effects and effective anticoagulation for continued treatment.     Follow up with pharmacy yearly sooner if needed    Amy Guardado, PharmD, BCPS    Verbal consent to manage patient's drug therapy was obtained from the patient and/or an individual authorized to act on behalf of a patient. They were informed they may decline to participate or withdraw from participation in pharmacy services at any time.

## 2025-01-13 RX ORDER — DAPAGLIFLOZIN 5 MG/1
5 TABLET, FILM COATED ORAL DAILY
Qty: 90 TABLET | Refills: 3 | Status: SHIPPED | OUTPATIENT
Start: 2025-01-13

## 2025-02-04 ENCOUNTER — APPOINTMENT (OUTPATIENT)
Dept: AUDIOLOGY | Facility: CLINIC | Age: 80
End: 2025-02-04
Payer: MEDICARE

## 2025-02-04 ENCOUNTER — APPOINTMENT (OUTPATIENT)
Dept: OTOLARYNGOLOGY | Facility: CLINIC | Age: 80
End: 2025-02-04
Payer: MEDICARE

## 2025-02-04 VITALS — TEMPERATURE: 96.8 F | BODY MASS INDEX: 36.52 KG/M2 | HEIGHT: 60 IN | WEIGHT: 186 LBS

## 2025-02-04 DIAGNOSIS — H90.3 ASYMMETRICAL SENSORINEURAL HEARING LOSS: ICD-10-CM

## 2025-02-04 DIAGNOSIS — R42 DIZZINESS: Primary | ICD-10-CM

## 2025-02-04 DIAGNOSIS — H81.10 BENIGN PAROXYSMAL POSITIONAL VERTIGO, UNSPECIFIED LATERALITY: ICD-10-CM

## 2025-02-04 DIAGNOSIS — R42 DIZZINESS: ICD-10-CM

## 2025-02-04 DIAGNOSIS — H90.3 SENSORINEURAL HEARING LOSS (SNHL) OF BOTH EARS: Primary | ICD-10-CM

## 2025-02-04 DIAGNOSIS — H69.93 DYSFUNCTION OF BOTH EUSTACHIAN TUBES: ICD-10-CM

## 2025-02-04 PROCEDURE — 1159F MED LIST DOCD IN RCRD: CPT | Performed by: OTOLARYNGOLOGY

## 2025-02-04 PROCEDURE — 1160F RVW MEDS BY RX/DR IN RCRD: CPT | Performed by: OTOLARYNGOLOGY

## 2025-02-04 PROCEDURE — 92557 COMPREHENSIVE HEARING TEST: CPT | Performed by: AUDIOLOGIST

## 2025-02-04 PROCEDURE — 92550 TYMPANOMETRY & REFLEX THRESH: CPT | Performed by: AUDIOLOGIST

## 2025-02-04 PROCEDURE — 99213 OFFICE O/P EST LOW 20 MIN: CPT | Performed by: OTOLARYNGOLOGY

## 2025-02-04 NOTE — PROGRESS NOTES
AUDIOLOGY ADULT AUDIOMETRIC EVALUATION    Name:  Laurence Andino  :  1945  Age:  79 y.o.  Date of Evaluation:  2025    Reason for visit: Ms. Andino is seen in the clinic today at the request of otolaryngology for an audiologic evaluation.     HISTORY  Patient complains of past dizziness but not now and long term asymmetric hearing loss and left sinus pressure.  She wears hearing aids and has no tinnitus or fullness.    EVALUATION  See scanned audiogram: “Media” > “Audiology Report”.      RESULTS  Otoscopic Evaluation:  Right Ear: clear ear canal/very slight cerumen  Left Ear: clear ear canal    Immittance Measures:  Tympanometry:  Right Ear: Type As, reduced tympanic membrane mobility with normal middle ear pressure   Left Ear: Type As, reduced tympanic membrane mobility with normal middle ear pressure     Acoustic Reflexes:  Ipsilateral Right Ear:  NR NR NR   Ipsilateral Left Ear:    NR NR NR   Contralateral Right Ear: did not evaluate  Contralateral Left Ear: did not evaluate    Distortion Product Otoacoustic Emissions (DPOAEs):  Right Ear: REFER 1-8 K HZ  Left Ear:    REFER: 1-8 K HZ    Audiometry:  Test Technique and Reliability:  BEHAVIORAL  Standard audiometry via INSERTS. Reliability is good.    Pure tone air and bone conduction audiometry:  Right Ear:  STABLE MODERATE MILD SNHL .   Left Ear:    STABLE SEVERE SNHL .    Speech Audiometry (Word Recognition Scores):   Right Ear: 92% GOOD  Left Ear:   84%  GOOD    IMPRESSIONS    STABLE ASYMMETRIC SNHL WITH PAST DIZZY BUT NOT NOW AND SINUS PRESSURE LEFT.   The presence of acoustic reflexes within normal intensity limits is consistent with normal middle ear and brainstem function, and suggests that auditory sensitivity is not significantly impaired. An elevated or absent acoustic reflex threshold is consistent with a middle ear disorder, hearing loss in the stimulated ear, and/or interruption of neural innervation of the stapedius muscle.  Present DPOAEs suggest normal/near normal cochlear outer hair cell function and are consistent with no greater than a mild hearing loss at those frequencies. Absent DPOAEs are consistent with abnormal cochlear outer hair cell function and some degree of hearing loss at those frequencies.    RECOMMENDATIONS  - Follow up with otolaryngology today as scheduled.  - Audiologic evaluation as needed.  - Annual audiologic evaluation, sooner if an acute change is noted.  - Audiologic evaluation in conjunction with otologic care, if an acute change is noted, and/or annually.  - - Continued hearing aid use.  - Follow-up with audiology annually for routine hearing aid maintenance, sooner if questions/problems arise.  - Follow-up with medical care team as planned.    PATIENT EDUCATION  Discussed results, impressions and recommendations with the patient. Questions were addressed and the patient was encouraged to contact our office should concerns arise.    Time for this encounter: 35 MINUTES    Macy Garces  Licensed Audiologist

## 2025-02-04 NOTE — PROGRESS NOTES
HPI  Laurence Andino is a 79 y.o. female history of bilateral sensorineural hearing loss.  She wears hearing aids bilaterally.  Asymmetry with history of normal ABR.  She is here today because of some dizziness around December.  Short, self-limited, associated with positional changes, true vertigo.  She had never had this before and has not had any since about a week of symptoms in December.  Audiogram today is stable in comparison with prior              Past Medical History:   Diagnosis Date    A-fib (Multi)     Bleeding disorder (CMS-HCC)     Coronary artery disease     Encounter for gynecological examination (general) (routine) without abnormal findings     Encounter for gynecological examination without abnormal finding    Encounter for screening mammogram for malignant neoplasm of breast     Visit for screening mammogram    Hypertension     Other specified disorders of eustachian tube, bilateral 08/31/2021    Dysfunction of both eustachian tubes    Personal history of other diseases of the musculoskeletal system and connective tissue     History of osteopenia            Medications:     Current Outpatient Medications:     apixaban (Eliquis) 5 mg tablet, Take 1 tablet (5 mg) by mouth 2 times a day., Disp: 180 tablet, Rfl: 3    atorvastatin (Lipitor) 40 mg tablet, Take 1 tablet (40 mg) by mouth once daily., Disp: 90 tablet, Rfl: 3    cholecalciferol (Vitamin D3) 50 mcg (2,000 unit) capsule, Take 1 capsule (50 mcg) by mouth once daily., Disp: , Rfl:     clopidogrel (Plavix) 75 mg tablet, Take 1 tablet (75 mg) by mouth once daily., Disp: 90 tablet, Rfl: 3    dapagliflozin propanediol (Farxiga) 5 mg, Take 1 tablet (5 mg) by mouth once daily., Disp: 90 tablet, Rfl: 3    fluticasone (Flonase) 50 mcg/actuation nasal spray, Administer 1 spray into each nostril once daily as needed for rhinitis., Disp: 48 g, Rfl: 3    losartan (Cozaar) 100 mg tablet, Take 1 tablet (100 mg) by mouth once daily., Disp: 90 tablet, Rfl: 3     metoprolol succinate XL (Toprol-XL) 50 mg 24 hr tablet, Take 1 tablet (50 mg) by mouth once daily., Disp: 90 tablet, Rfl: 2    nystatin (Mycostatin) 100,000 unit/gram powder, Apply 1 Application topically if needed for itching., Disp: , Rfl:     spironolactone (Aldactone) 25 mg tablet, Take 1 tablet (25 mg) by mouth once daily., Disp: 90 tablet, Rfl: 3    vit A/vit C/vit E/zinc/copper (ICAPS AREDS ORAL), Take 1 capsule by mouth 2 times a day., Disp: , Rfl:      Allergies:  Allergies   Allergen Reactions    Shellfish Containing Products Anaphylaxis    Ciprofloxacin Other     vomiting    Erythromycin Base Unknown    Penicillins Hives        Physical Exam:  Last Recorded Vitals  Temperature 36 °C (96.8 °F), height 1.524 m (5'), weight 84.4 kg (186 lb).  General:     General appearance: Well-developed, well-nourished in no acute distress.       Voice:  normal       Head/face: Normal appearance; nontender to palpation     Facial nerve function: Normal and symmetric bilaterally.    Oral/oropharynx:     Oral vestibule: Normal labial and gingival mucosa     Tongue/floor of mouth: Normal without lesion     Oropharynx: Clear.  No lesions present of the hard/soft palate, posterior pharynx    Neck:     Neck: Normal appearance, trachea midline     Salivary glands: Normal to palpation bilaterally     Lymph nodes: No cervical lymphadenopathy to palpation     Thyroid: No thyromegaly.  No palpable nodules     Range of motion: Normal    Neurological:     Cortical functions: Alert and oriented x3, appropriate affect       Larynx/hypopharynx:     Laryngeal findings: Mirror exam inadequate or limited secondary to enlarged base of tongue and/or excessive gagging    Ear:     Ear canal: Normal bilaterally     Tympanic membrane: Intact and mobile bilaterally     Pinna: Normal bilaterally     Hearing:  Gross hearing assessment normal by voice  [place]-Hallpike testing negative bilaterally    Nose:     Visualized using: Anterior rhinoscopy      Nasopharynx: Inadequate mirror exam secondary to gag, anatomy.       Nasal dorsum: Nontraumatic midline appearance     Septum: Midline     Inferior turbinates: Normally sized     Mucosa: Bilateral, pink, normal appearing       ASSESSMENT/PLAN:  History is consistent with BPPV.  Resolved.  Audiogram stable.  She was reassured.  Exercises and handout reviewed.  Recheck as needed with recurrence.  Recheck in a year, sooner as needed        Satinder Agrawal MD

## 2025-02-05 DIAGNOSIS — I21.4 NON-ST ELEVATION MYOCARDIAL INFARCTION (NSTEMI) (MULTI): ICD-10-CM

## 2025-02-07 RX ORDER — LOSARTAN POTASSIUM 100 MG/1
100 TABLET ORAL DAILY
Qty: 90 TABLET | Refills: 3 | Status: SHIPPED | OUTPATIENT
Start: 2025-02-07 | End: 2026-02-07

## 2025-02-11 ENCOUNTER — OFFICE VISIT (OUTPATIENT)
Dept: CARDIOLOGY | Facility: CLINIC | Age: 80
End: 2025-02-11
Payer: MEDICARE

## 2025-02-11 VITALS
WEIGHT: 197 LBS | OXYGEN SATURATION: 95 % | HEART RATE: 82 BPM | BODY MASS INDEX: 39.72 KG/M2 | SYSTOLIC BLOOD PRESSURE: 137 MMHG | HEIGHT: 59 IN | DIASTOLIC BLOOD PRESSURE: 81 MMHG

## 2025-02-11 DIAGNOSIS — Z95.5 H/O HEART ARTERY STENT: ICD-10-CM

## 2025-02-11 DIAGNOSIS — I48.91 ATRIAL FIBRILLATION WITH RVR (MULTI): ICD-10-CM

## 2025-02-11 DIAGNOSIS — I10 ESSENTIAL HYPERTENSION: ICD-10-CM

## 2025-02-11 DIAGNOSIS — I25.5 CARDIOMYOPATHY, ISCHEMIC: Primary | ICD-10-CM

## 2025-02-11 PROCEDURE — 1159F MED LIST DOCD IN RCRD: CPT | Performed by: INTERNAL MEDICINE

## 2025-02-11 PROCEDURE — 99214 OFFICE O/P EST MOD 30 MIN: CPT | Performed by: INTERNAL MEDICINE

## 2025-02-11 PROCEDURE — 3079F DIAST BP 80-89 MM HG: CPT | Performed by: INTERNAL MEDICINE

## 2025-02-11 PROCEDURE — 93005 ELECTROCARDIOGRAM TRACING: CPT | Performed by: INTERNAL MEDICINE

## 2025-02-11 PROCEDURE — G2211 COMPLEX E/M VISIT ADD ON: HCPCS | Performed by: INTERNAL MEDICINE

## 2025-02-11 PROCEDURE — 3075F SYST BP GE 130 - 139MM HG: CPT | Performed by: INTERNAL MEDICINE

## 2025-02-11 NOTE — PROGRESS NOTES
"Chief Complaint:   No chief complaint on file.     History Of Present Illness:    Laurence Andino is a 79 y.o. female presenting with ***.     Last Recorded Vitals:  Vitals:    02/11/25 1037   BP: 137/81   BP Location: Right arm   Patient Position: Sitting   Pulse: 82   SpO2: 95%   Weight: 89.4 kg (197 lb)   Height: 1.499 m (4' 11\")       Past Medical History:  She has a past medical history of A-fib (Multi), Bleeding disorder (CMS-HCC), Coronary artery disease, Encounter for gynecological examination (general) (routine) without abnormal findings, Encounter for screening mammogram for malignant neoplasm of breast, Hypertension, Other specified disorders of eustachian tube, bilateral (08/31/2021), and Personal history of other diseases of the musculoskeletal system and connective tissue.    Past Surgical History:  She has a past surgical history that includes Colonoscopy (05/14/2013) and Cardiac surgery.      Social History:  She reports that she has quit smoking. Her smoking use included cigarettes. She does not have any smokeless tobacco history on file. She reports that she does not drink alcohol and does not use drugs.    Family History:  Family History   Problem Relation Name Age of Onset    Heart disease Mother      Asthma Mother      Diabetes Mother      Heart disease Father      Diabetes Father      No Known Problems Son          Allergies:  Shellfish containing products, Ciprofloxacin, Erythromycin base, and Penicillins    Outpatient Medications:  Current Outpatient Medications   Medication Instructions    apixaban (ELIQUIS) 5 mg, oral, 2 times daily    atorvastatin (LIPITOR) 40 mg, oral, Daily    cholecalciferol (VITAMIN D3) 2,000 Units, Daily    clopidogrel (PLAVIX) 75 mg, oral, Daily    dapagliflozin propanediol (FARXIGA) 5 mg, oral, Daily    fluticasone (Flonase) 50 mcg/actuation nasal spray 1 spray, Each Nostril, Daily PRN    losartan (COZAAR) 100 mg, oral, Daily    metoprolol succinate XL (TOPROL-XL) " 50 mg, oral, Daily    nystatin (Mycostatin) 100,000 unit/gram powder 1 Application, As needed    spironolactone (ALDACTONE) 25 mg, oral, Daily    vit A/vit C/vit E/zinc/copper (ICAPS AREDS ORAL) 1 capsule, 2 times daily       Physical Exam:  ***     Last Labs:  CBC -  Lab Results   Component Value Date    WBC 7.0 11/25/2024    HGB 13.9 11/25/2024    HCT 43.7 11/25/2024    MCV 95 11/25/2024     11/25/2024       CMP -  Lab Results   Component Value Date    CALCIUM 9.9 11/25/2024    PHOS 3.1 02/14/2023    PROT 7.3 11/25/2024    ALBUMIN 4.4 11/25/2024    AST 22 11/25/2024    ALT 22 11/25/2024    ALKPHOS 67 11/25/2024    BILITOT 0.7 11/25/2024       LIPID PANEL -   Lab Results   Component Value Date    CHOL 105 11/25/2024    TRIG 59 11/25/2024    HDL 50.9 11/25/2024    CHHDL 2.1 11/25/2024    LDLF 51 02/10/2023    VLDL 12 11/25/2024    NHDL 54 11/25/2024       RENAL FUNCTION PANEL -   Lab Results   Component Value Date    GLUCOSE 98 11/25/2024     11/25/2024    K 3.9 11/25/2024     11/25/2024    CO2 28 11/25/2024    ANIONGAP 12 11/25/2024    BUN 24 (H) 11/25/2024    CREATININE 0.88 11/25/2024    CALCIUM 9.9 11/25/2024    PHOS 3.1 02/14/2023    ALBUMIN 4.4 11/25/2024        Lab Results   Component Value Date     (H) 02/09/2023    HGBA1C 5.4 11/25/2024       Last Cardiology Tests:  ECG independently reviewed from today: sinus rhythm, rate 73, LBBB     Cath 2/13/2023:  1. Left main: no significant angiographic disease.  2. LAD: mild diffuse disease.  3. LCx: 75% proximal disease, 95% mid-vessel stenosis.  4. RCA: 90% mid-vessel stenosis.  5. LVEDP 11mmHg, no aortic stenosis on LV-Ao gradient.  6. Successful PCI to severe prox-mid LCx disease with one [place] JACKELYN.  7. Successful PCI to severe mid-RCA disease with one [place] JACKELYN.     Eco 4/7/23:  1. Left ventricular systolic function is mildly to moderately decreased with a 40% estimated ejection fraction.  2. Abnormal septal motion  consistent with left bundle branch block.  3. There is global hypokinesis of the left ventricle with minor regional variations.     Assessment/Plan   Very pleasant 77 year-old female with paroxysmal AF, CAD s/p PCI to LCx and RCA in 2/2023, CMO (EF 40%). Doing well from CV standpoint currently, NYHA II.  Blood pressure needs better control     Plan:  -continue current plavix and apixaban with PCI and paroxysmal AFib  -continue current atorvastatin with CAD   -increase losartan to 100mg daily for BP control  -continue current metoprolol and spironolactone for CMO  -f/u in 1 year or sooner if needed      Adeel Hinkle MD

## 2025-02-12 LAB
ATRIAL RATE: 71 BPM
P AXIS: 58 DEGREES
P OFFSET: 190 MS
P ONSET: 135 MS
PR INTERVAL: 148 MS
Q ONSET: 209 MS
QRS COUNT: 12 BEATS
QRS DURATION: 126 MS
QT INTERVAL: 446 MS
QTC CALCULATION(BAZETT): 484 MS
QTC FREDERICIA: 472 MS
R AXIS: -68 DEGREES
T AXIS: 89 DEGREES
T OFFSET: 432 MS
VENTRICULAR RATE: 71 BPM

## 2025-02-17 PROCEDURE — RXMED WILLOW AMBULATORY MEDICATION CHARGE

## 2025-02-20 ENCOUNTER — PHARMACY VISIT (OUTPATIENT)
Dept: PHARMACY | Facility: CLINIC | Age: 80
End: 2025-02-20
Payer: COMMERCIAL

## 2025-03-12 ENCOUNTER — TELEPHONE (OUTPATIENT)
Dept: AUDIOLOGY | Facility: CLINIC | Age: 80
End: 2025-03-12
Payer: MEDICARE

## 2025-03-12 NOTE — TELEPHONE ENCOUNTER
Hearing aid was dropped off at the  in [place] 3/12/2025. Per patient ight hearing aid was not charging properly and would not turn on or make sound. [employer] was called and extended warranty until 4/7/2025. Hearing aid  will be replaced on return. A short appointment should be made with [facility] when hearing aids return to switch patient to Cerustop wax guards and go over changing them.   
What Is The Reason For Today's Visit?: Full Body Skin Examination
How Severe Are Your Spot(S)?: mild

## 2025-03-18 ENCOUNTER — OFFICE VISIT (OUTPATIENT)
Dept: URGENT CARE | Age: 80
End: 2025-03-18
Payer: MEDICARE

## 2025-03-18 VITALS
WEIGHT: 191.58 LBS | BODY MASS INDEX: 38.62 KG/M2 | OXYGEN SATURATION: 97 % | DIASTOLIC BLOOD PRESSURE: 65 MMHG | RESPIRATION RATE: 16 BRPM | TEMPERATURE: 98.4 F | HEIGHT: 59 IN | SYSTOLIC BLOOD PRESSURE: 145 MMHG | HEART RATE: 89 BPM

## 2025-03-18 DIAGNOSIS — J01.00 ACUTE NON-RECURRENT MAXILLARY SINUSITIS: Primary | ICD-10-CM

## 2025-03-18 PROCEDURE — 1159F MED LIST DOCD IN RCRD: CPT

## 2025-03-18 PROCEDURE — 1160F RVW MEDS BY RX/DR IN RCRD: CPT

## 2025-03-18 PROCEDURE — 99213 OFFICE O/P EST LOW 20 MIN: CPT

## 2025-03-18 RX ORDER — DOXYCYCLINE 100 MG/1
100 CAPSULE ORAL 2 TIMES DAILY
Qty: 20 CAPSULE | Refills: 0 | Status: SHIPPED | OUTPATIENT
Start: 2025-03-18 | End: 2025-03-28

## 2025-03-18 ASSESSMENT — PATIENT HEALTH QUESTIONNAIRE - PHQ9
2. FEELING DOWN, DEPRESSED OR HOPELESS: NOT AT ALL
SUM OF ALL RESPONSES TO PHQ9 QUESTIONS 1 & 2: 0
1. LITTLE INTEREST OR PLEASURE IN DOING THINGS: NOT AT ALL

## 2025-03-18 NOTE — PATIENT INSTRUCTIONS
Take medications as prescribed.  Continue using your Flonase following dosing instructions.  Maintain adequate hydration and nutrition.  If symptoms worsen, do not improve, or any other concerning or worrisome symptoms develop please return to the clinic or go to the emergency department.  Follow-up with PCP in 1 to 2 weeks for recheck.

## 2025-03-18 NOTE — PROGRESS NOTES
Subjective   Patient ID: Laurence Andino is a 79 y.o. female. They present today with a chief complaint of Sinus Drainage (X 8 days) and Nasal Congestion (Runny nose).    History of Present Illness  79-year-old female presents urgent care for concern of sinus infection.  States she has history of sinus infections.  States she has had sinus pressure/drainage from the postnasal drip for the past 8 days not improving.  Denies any current fevers or chills, nausea vomiting sweats, chest pain shortness of breath, abdominal pain.  States she has taken doxycycline in the past without any complications.  Prescribed doxycycline.  States she has Flonase at home and instructed to use this.  Follow-up PCP 1 to 2 weeks for recheck.  Return/ER precautions.  Patient agrees with plan.          Past Medical History  Allergies as of 03/18/2025 - Reviewed 03/18/2025   Allergen Reaction Noted    Shellfish containing products Anaphylaxis 08/31/2023    Ciprofloxacin Other 08/31/2023    Erythromycin base Unknown 08/31/2023    Penicillins Hives 08/31/2023       (Not in a hospital admission)       Past Medical History:   Diagnosis Date    A-fib (Multi)     Bleeding disorder (CMS-[facility])     Coronary artery disease     Encounter for gynecological examination (general) (routine) without abnormal findings     Encounter for gynecological examination without abnormal finding    Encounter for screening mammogram for malignant neoplasm of breast     Visit for screening mammogram    Hypertension     Other specified disorders of eustachian tube, bilateral 08/31/2021    Dysfunction of both eustachian tubes    Personal history of other diseases of the musculoskeletal system and connective tissue     History of osteopenia       Past Surgical History:   Procedure Laterality Date    CARDIAC SURGERY      COLONOSCOPY  05/14/2013    Complete Colonoscopy        reports that she quit smoking about 60 years ago. Her smoking use included cigarettes. She started  "smoking about 15 years ago. She does not have any smokeless tobacco history on file. She reports that she does not drink alcohol and does not use drugs.    Review of Systems  Review of Systems   All other systems reviewed and are negative.                                 Objective    Vitals:    03/18/25 1352   BP: 145/65   Pulse: 89   Resp: 16   Temp: 36.9 °C (98.4 °F)   TempSrc: Oral   SpO2: 97%   Weight: 86.9 kg (191 lb 9.3 oz)   Height: 1.499 m (4' 11\")     No LMP recorded (approximate). Patient is postmenopausal.    Physical Exam  Vitals reviewed.   Constitutional:       General: She is not in acute distress.     Appearance: Normal appearance. She is not ill-appearing, toxic-appearing or diaphoretic.   HENT:      Head: Normocephalic and atraumatic.      Comments: Maxillary sinus tenderness     Right Ear: Tympanic membrane, ear canal and external ear normal.      Left Ear: Tympanic membrane, ear canal and external ear normal.      Nose: Congestion and rhinorrhea present.      Mouth/Throat:      Mouth: Mucous membranes are moist.      Comments: Pharynx mildly erythematous without exudate, uvula midline normal, airway clear.  Cardiovascular:      Rate and Rhythm: Normal rate and regular rhythm.   Pulmonary:      Effort: Pulmonary effort is normal. No respiratory distress.      Breath sounds: Normal breath sounds. No stridor. No wheezing, rhonchi or rales.   Abdominal:      General: Abdomen is flat.      Palpations: Abdomen is soft.      Tenderness: There is no abdominal tenderness. There is no right CVA tenderness or left CVA tenderness.   Musculoskeletal:      Cervical back: Normal range of motion and neck supple. No rigidity or tenderness.   Lymphadenopathy:      Cervical: No cervical adenopathy.   Skin:     General: Skin is warm and dry.   Neurological:      General: No focal deficit present.      Mental Status: She is alert and oriented to person, place, and time.   Psychiatric:         Mood and Affect: Mood " normal.         Behavior: Behavior normal.         Procedures    Point of Care Test & Imaging Results from this visit  No results found for this visit on 03/18/25.   No results found.    Diagnostic study results (if any) were reviewed by Wayne Watts PA-C.    Assessment/Plan   Allergies, medications, history, and pertinent labs/EKGs/Imaging reviewed by Wayne Watts PA-C.     Medical Decision Making  79-year-old female presents urgent care for concern of sinus infection.  States she has history of sinus infections.  States she has had sinus pressure/drainage from the postnasal drip for the past 8 days not improving.  Denies any current fevers or chills, nausea vomiting sweats, chest pain shortness of breath, abdominal pain.  States she has taken doxycycline in the past without any complications.  Prescribed doxycycline.  States she has Flonase at home and instructed to use this.  Follow-up PCP 1 to 2 weeks for recheck.  Return/ER precautions.  Patient agrees with plan.    Orders and Diagnoses  There are no diagnoses linked to this encounter.    Medical Admin Record      Patient disposition: Home    Electronically signed by Wayne Watts PA-C  2:02 PM

## 2025-03-21 ENCOUNTER — TELEPHONE (OUTPATIENT)
Dept: AUDIOLOGY | Facility: CLINIC | Age: 80
End: 2025-03-21
Payer: MEDICARE

## 2025-03-21 NOTE — TELEPHONE ENCOUNTER
Hearing aids returned from 's repair. A short appointment was scheduled with audiology assistant to learn how to change Cerustop wax guard and patient will pick hearing aids up at that time.

## 2025-04-03 ENCOUNTER — TELEPHONE (OUTPATIENT)
Dept: PRIMARY CARE | Facility: CLINIC | Age: 80
End: 2025-04-03
Payer: MEDICARE

## 2025-04-03 NOTE — TELEPHONE ENCOUNTER
Pt getting tooth pulled Monday 4/7, inquires about when to stop and resume Eliquis and clopidogrel?    Please advise ASAP

## 2025-04-03 NOTE — TELEPHONE ENCOUNTER
The dentist just called and stated she is going to keep her on her meds but add an anticoagulant.  She is going to notify the patient.

## 2025-04-03 NOTE — TELEPHONE ENCOUNTER
Dr Mindy Saucedo [phone]  LMOVM at dentist office requesting form, clarification on which meds to hold, and procedure pt having done on Monday 4/7.   Dentist office closed from 1 pm to 2 pm.   Provided phone number and fax number for Dr Dallas    Pt asked for our office to let her know instructions.

## 2025-04-03 NOTE — TELEPHONE ENCOUNTER
Please get the dentist's info and see if they are requesting she come off of one of both of these (and if they have a form or something to fill out).  Most guidelines nowadays would say that she does not have to come off of both depending on the procedure.  We will probably have her hold the Eliquis for 1-2 days but this should be a conversation between the dentist's office and us without putting her in the middle.  We can let her know as soon as we touch base with the dentist and see what they are requesting.

## 2025-04-07 ENCOUNTER — APPOINTMENT (OUTPATIENT)
Dept: RADIOLOGY | Facility: HOSPITAL | Age: 80
End: 2025-04-07
Payer: MEDICARE

## 2025-04-08 ASSESSMENT — ENCOUNTER SYMPTOMS
SHORTNESS OF BREATH: 0
FEVER: 0
HEADACHES: 0
CHILLS: 0
NAUSEA: 0
ABDOMINAL PAIN: 0
VOMITING: 0
DIARRHEA: 0
COUGH: 0
APPETITE CHANGE: 0

## 2025-04-08 NOTE — PROGRESS NOTES
[facility]: [facility]  MEDICARE WELLNESS EXAM      Laurence Andino is a 79 y.o. female that is presenting today for Annual Exam (CPE/).    Assessment/Plan    Diagnoses and all orders for this visit:  Annual physical exam  -     fluticasone (Flonase) 50 mcg/actuation nasal spray; Administer 1 spray into each nostril once daily as needed for rhinitis.  Atrial fibrillation with RVR (Multi)  -     metoprolol succinate XL (Toprol-XL) 50 mg 24 hr tablet; Take 1 tablet (50 mg) by mouth once daily.  Mixed hyperlipidemia  -     CBC and Auto Differential; Future  -     Lipid Panel; Future  Dyslipidemia  -     Comprehensive Metabolic Panel; Future  -     TSH with reflex to Free T4 if abnormal; Future  -     clopidogrel (Plavix) 75 mg tablet; Take 1 tablet (75 mg) by mouth once daily.  -     atorvastatin (Lipitor) 40 mg tablet; Take 1 tablet (40 mg) by mouth once daily.  Prediabetes  Encounter for screening for osteoporosis  -     XR DEXA bone density; Future  Menopause  -     XR DEXA bone density; Future  Hyperglycemia  -     Hemoglobin A1C; Future  Vitamin D deficiency  -     Vitamin D 25-Hydroxy,Total (for eval of Vitamin D levels); Future  Non-ST elevation myocardial infarction (NSTEMI) (Multi)  -     clopidogrel (Plavix) 75 mg tablet; Take 1 tablet (75 mg) by mouth once daily.  -     spironolactone (Aldactone) 25 mg tablet; Take 1 tablet (25 mg) by mouth once daily.  Other orders  -     Follow Up In Primary Care - Medicare Annual  -     Follow Up In Primary Care - Medicare Annual; Future  -     Pneumococcal conjugate vaccine, 20-valent (PREVNAR 20)    ADVANCED CARE PLANNING  Advanced Care Planning was discussed with patient:  The patient has an active advanced care plan on file. The patient has an active surrogate decision-maker on file.  Encouraged the patient to confirm that Living Will and Healthcare Power of  (HCPoA) are accurate and up to date.  Encouraged the patient to confirm that  our office be provided a copy of any documentation in the event that anything changes.    ACTIVITIES OF DAILY LIVING  Basic ADLs:  Bathing: Independent, Dressing: Independent, Toileting: Independent, Transferring: Independent, Continence: Independent, Feeding: Independent.    Instrumental ADLs:  Ability to use phone: Independent, Shopping: Independent, Cooking: Independent, House-keeping: Independent, Laundry: Independent, Transportation: Independent, Medication Management: Independent, Finance Management: Independent.    Subjective   HPI  This patient presents today for annual physical, Medicare wellness exam.  Discussed screening/prevention, healthy lifestyle and code status.   Reviewed the patient's wishes regarding decision making.    Consultant visits and notes reviewed: Cardio, ENT    Stable from a functional standpoint in regard to ADLs and IADLs.  No recent falls are reported.    The patient denies chest pain and shortness of breath.  No exertion-provoked or anginal-type symptoms are reported.    Review of Systems   Constitutional:  Negative for appetite change, chills and fever.   Respiratory:  Negative for cough and shortness of breath.    Cardiovascular:  Negative for chest pain.   Gastrointestinal:  Negative for abdominal pain, diarrhea, nausea and vomiting.   Neurological:  Negative for headaches.   All other systems reviewed and are negative.    Objective   Vitals:    04/11/25 1045   BP: 136/80   Pulse:    Temp:    SpO2:       Body mass index is 38.38 kg/m².  Physical Exam  Vitals reviewed.   Constitutional:       General: She is not in acute distress.     Appearance: She is not toxic-appearing.   HENT:      Head: Normocephalic and atraumatic.      Mouth/Throat:      Mouth: Mucous membranes are moist.   Eyes:      Pupils: Pupils are equal, round, and reactive to light.   Cardiovascular:      Rate and Rhythm: Normal rate and regular rhythm.      Heart sounds: No murmur heard.  Pulmonary:      Breath  "sounds: Normal breath sounds. No wheezing, rhonchi or rales.   Abdominal:      General: There is no distension.      Palpations: Abdomen is soft.   Musculoskeletal:      Right lower leg: No edema.      Left lower leg: No edema.   Neurological:      General: No focal deficit present.      Mental Status: She is alert and oriented to person, place, and time.       Diagnostic Results   Lab Results   Component Value Date    GLUCOSE 98 11/25/2024    CALCIUM 9.9 11/25/2024     11/25/2024    K 3.9 11/25/2024    CO2 28 11/25/2024     11/25/2024    BUN 24 (H) 11/25/2024    CREATININE 0.88 11/25/2024     Lab Results   Component Value Date    ALT 22 11/25/2024    AST 22 11/25/2024    ALKPHOS 67 11/25/2024    BILITOT 0.7 11/25/2024     Lab Results   Component Value Date    WBC 7.0 11/25/2024    HGB 13.9 11/25/2024    HCT 43.7 11/25/2024    MCV 95 11/25/2024     11/25/2024     Lab Results   Component Value Date    CHOL 105 11/25/2024    CHOL 118 11/21/2023    CHOL 95 02/10/2023     Lab Results   Component Value Date    HDL 50.9 11/25/2024    HDL 51.8 11/21/2023    HDL 26.0 (A) 02/10/2023     Lab Results   Component Value Date    LDLCALC 42 11/25/2024    LDLCALC 52 11/21/2023    LDLCALC 82 11/14/2022     Lab Results   Component Value Date    TRIG 59 11/25/2024    TRIG 71 11/21/2023    TRIG 91 02/10/2023     No components found for: \"CHOLHDL\"  Lab Results   Component Value Date    HGBA1C 5.4 11/25/2024     Other labs not included in the list above reviewed either before or during this encounter.    History   Past Medical History:   Diagnosis Date    A-fib (Multi)     Bleeding disorder     Coronary artery disease     Encounter for gynecological examination (general) (routine) without abnormal findings     Encounter for gynecological examination without abnormal finding    Encounter for screening mammogram for malignant neoplasm of breast     Visit for screening mammogram    Hypertension     Other specified " disorders of eustachian tube, bilateral 2021    Dysfunction of both eustachian tubes    Personal history of other diseases of the musculoskeletal system and connective tissue     History of osteopenia     Past Surgical History:   Procedure Laterality Date    CARDIAC SURGERY      COLONOSCOPY  2013    Complete Colonoscopy     Family History   Problem Relation Name Age of Onset    Heart disease Mother      Asthma Mother      Diabetes Mother      Heart disease Father      Diabetes Father      No Known Problems Son       Social History     Socioeconomic History    Marital status:      Spouse name: Not on file    Number of children: Not on file    Years of education: Not on file    Highest education level: Not on file   Occupational History    Not on file   Tobacco Use    Smoking status: Former     Types: Cigarettes     Start date:      Quit date:      Years since quittin.3     Passive exposure: Past    Smokeless tobacco: Not on file   Vaping Use    Vaping status: Never Used   Substance and Sexual Activity    Alcohol use: Never    Drug use: Never    Sexual activity: Not on file   Other Topics Concern    Not on file   Social History Narrative    Not on file     Social Drivers of Health     Financial Resource Strain: Not on file   Food Insecurity: Not on file   Transportation Needs: Not on file   Physical Activity: Not on file   Stress: Not on file   Social Connections: Not on file   Intimate Partner Violence: Not on file   Housing Stability: Not on file     Allergies   Allergen Reactions    Shellfish Containing Products Anaphylaxis    Ciprofloxacin Other     vomiting    Erythromycin Base Unknown    Penicillins Hives     Current Outpatient Medications on File Prior to Visit   Medication Sig Dispense Refill    apixaban (Eliquis) 5 mg tablet Take 1 tablet (5 mg) by mouth 2 times a day. 180 tablet 3    cholecalciferol (Vitamin D3) 50 mcg (2,000 unit) capsule Take 1 capsule (2,000 Units) by mouth  once daily.      dapagliflozin propanediol (Farxiga) 5 mg Take 1 tablet (5 mg) by mouth once daily. 90 tablet 3    losartan (Cozaar) 100 mg tablet Take 1 tablet (100 mg) by mouth once daily. 90 tablet 3    nystatin (Mycostatin) 100,000 unit/gram powder Apply 1 Application topically if needed for itching.      vit A/vit C/vit E/zinc/copper (ICAPS AREDS ORAL) Take 1 capsule by mouth 2 times a day.      [DISCONTINUED] fluticasone (Flonase) 50 mcg/actuation nasal spray Administer 1 spray into each nostril once daily as needed for rhinitis. 48 g 3    [DISCONTINUED] metoprolol succinate XL (Toprol-XL) 50 mg 24 hr tablet Take 1 tablet (50 mg) by mouth once daily. 90 tablet 2    [DISCONTINUED] spironolactone (Aldactone) 25 mg tablet Take 1 tablet (25 mg) by mouth once daily. 90 tablet 3    [DISCONTINUED] atorvastatin (Lipitor) 40 mg tablet Take 1 tablet (40 mg) by mouth once daily. 90 tablet 3    [DISCONTINUED] clopidogrel (Plavix) 75 mg tablet Take 1 tablet (75 mg) by mouth once daily. 90 tablet 3     No current facility-administered medications on file prior to visit.     Immunization History   Administered Date(s) Administered    COVID-19, mRNA, LNP-S, PF, 30 mcg/0.3 mL dose 03/18/2021, 11/09/2021    Flu vaccine (IIV4), preservative free *Check age/dose* 09/21/2020, 10/19/2022    Flu vaccine, quadrivalent, high-dose, preservative free, age 65y+ (FLUZONE) 09/21/2020, 10/14/2022    Flu vaccine, trivalent, preservative free, HIGH-DOSE, age 65y+ (Fluzone) 09/23/2017, 09/12/2018, 09/23/2019, 10/07/2019, 09/30/2024    Flu vaccine, trivalent, preservative free, age 6 months and greater (Fluarix/Fluzone/Flulaval) 09/08/2015    Influenza, Seasonal, Quadrivalent, Adjuvanted 09/17/2021    Influenza, Unspecified 11/17/2009, 10/12/2010, 09/16/2013    Influenza, injectable, quadrivalent 09/13/2021    Influenza, seasonal, injectable 09/13/2011, 09/11/2012, 09/22/2014, 09/08/2015, 09/08/2016, 09/14/2016    Influenza, trivalent,  adjuvanted 09/12/2018    Moderna SARS-CoV-2 Vaccination 03/18/2021    Pfizer COVID-19 vaccine, 12 years and older, (30mcg/0.3mL) (Comirnaty) 09/30/2024    Pfizer COVID-19 vaccine, bivalent, age 12 years and older (30 mcg/0.3 mL) 06/08/2023    Pfizer Purple Cap SARS-CoV-2 02/25/2021    Pneumococcal conjugate vaccine, 13-valent (PREVNAR 13) 10/01/2012, 01/26/2015, 09/12/2018, 10/07/2019    Pneumococcal conjugate vaccine, 20-valent (PREVNAR 20) 04/11/2025    Pneumococcal polysaccharide vaccine, 23-valent, age 2 years and older (PNEUMOVAX 23) 10/12/2010, 12/12/2010, 09/23/2019    Tdap vaccine, age 7 year and older (BOOSTRIX, ADACEL) 02/28/2019    Zoster vaccine, recombinant, adult (SHINGRIX) 02/28/2019, 05/16/2019     Patient's medical history was reviewed and updated either before or during this encounter.     Fernando Dallas MD

## 2025-04-09 ENCOUNTER — HOSPITAL ENCOUNTER (OUTPATIENT)
Dept: RADIOLOGY | Facility: HOSPITAL | Age: 80
Discharge: HOME | End: 2025-04-09
Payer: MEDICARE

## 2025-04-09 VITALS — HEIGHT: 59 IN | WEIGHT: 191 LBS | BODY MASS INDEX: 38.51 KG/M2

## 2025-04-09 DIAGNOSIS — Z12.31 ENCOUNTER FOR SCREENING MAMMOGRAM FOR BREAST CANCER: ICD-10-CM

## 2025-04-09 PROCEDURE — 77063 BREAST TOMOSYNTHESIS BI: CPT

## 2025-04-09 PROCEDURE — 77067 SCR MAMMO BI INCL CAD: CPT | Performed by: RADIOLOGY

## 2025-04-09 PROCEDURE — 77063 BREAST TOMOSYNTHESIS BI: CPT | Performed by: RADIOLOGY

## 2025-04-11 ENCOUNTER — OFFICE VISIT (OUTPATIENT)
Dept: PRIMARY CARE | Facility: CLINIC | Age: 80
End: 2025-04-11
Payer: MEDICARE

## 2025-04-11 VITALS
BODY MASS INDEX: 38.3 KG/M2 | HEIGHT: 59 IN | HEART RATE: 76 BPM | WEIGHT: 190 LBS | SYSTOLIC BLOOD PRESSURE: 136 MMHG | TEMPERATURE: 97.6 F | DIASTOLIC BLOOD PRESSURE: 80 MMHG | OXYGEN SATURATION: 98 %

## 2025-04-11 DIAGNOSIS — I21.4 NON-ST ELEVATION MYOCARDIAL INFARCTION (NSTEMI) (MULTI): ICD-10-CM

## 2025-04-11 DIAGNOSIS — E55.9 VITAMIN D DEFICIENCY: ICD-10-CM

## 2025-04-11 DIAGNOSIS — E78.5 DYSLIPIDEMIA: ICD-10-CM

## 2025-04-11 DIAGNOSIS — R73.03 PREDIABETES: ICD-10-CM

## 2025-04-11 DIAGNOSIS — E78.2 MIXED HYPERLIPIDEMIA: ICD-10-CM

## 2025-04-11 DIAGNOSIS — Z00.00 ANNUAL PHYSICAL EXAM: Primary | ICD-10-CM

## 2025-04-11 DIAGNOSIS — R73.9 HYPERGLYCEMIA: ICD-10-CM

## 2025-04-11 DIAGNOSIS — I48.91 ATRIAL FIBRILLATION WITH RVR (MULTI): ICD-10-CM

## 2025-04-11 DIAGNOSIS — Z78.0 MENOPAUSE: ICD-10-CM

## 2025-04-11 DIAGNOSIS — Z13.820 ENCOUNTER FOR SCREENING FOR OSTEOPOROSIS: ICD-10-CM

## 2025-04-11 PROCEDURE — 99215 OFFICE O/P EST HI 40 MIN: CPT | Mod: 25 | Performed by: INTERNAL MEDICINE

## 2025-04-11 PROCEDURE — 90677 PCV20 VACCINE IM: CPT | Performed by: INTERNAL MEDICINE

## 2025-04-11 RX ORDER — FLUTICASONE PROPIONATE 50 MCG
1 SPRAY, SUSPENSION (ML) NASAL DAILY PRN
Qty: 48 G | Refills: 3 | Status: SHIPPED | OUTPATIENT
Start: 2025-04-11

## 2025-04-11 RX ORDER — SPIRONOLACTONE 25 MG/1
25 TABLET ORAL DAILY
Qty: 90 TABLET | Refills: 3 | Status: SHIPPED | OUTPATIENT
Start: 2025-04-11 | End: 2026-04-11

## 2025-04-11 RX ORDER — CLOPIDOGREL BISULFATE 75 MG/1
75 TABLET ORAL DAILY
Qty: 90 TABLET | Refills: 3 | Status: SHIPPED | OUTPATIENT
Start: 2025-04-11 | End: 2026-04-11

## 2025-04-11 RX ORDER — METOPROLOL SUCCINATE 50 MG/1
50 TABLET, EXTENDED RELEASE ORAL DAILY
Qty: 90 TABLET | Refills: 2 | Status: SHIPPED | OUTPATIENT
Start: 2025-04-11

## 2025-04-11 RX ORDER — ATORVASTATIN CALCIUM 40 MG/1
40 TABLET, FILM COATED ORAL DAILY
Qty: 90 TABLET | Refills: 3 | Status: SHIPPED | OUTPATIENT
Start: 2025-04-11 | End: 2026-04-11

## 2025-04-11 ASSESSMENT — PATIENT HEALTH QUESTIONNAIRE - PHQ9
SUM OF ALL RESPONSES TO PHQ9 QUESTIONS 1 AND 2: 0
1. LITTLE INTEREST OR PLEASURE IN DOING THINGS: NOT AT ALL
2. FEELING DOWN, DEPRESSED OR HOPELESS: NOT AT ALL

## 2025-04-11 ASSESSMENT — PAIN SCALES - GENERAL: PAINLEVEL_OUTOF10: 0-NO PAIN

## 2025-04-29 ENCOUNTER — APPOINTMENT (OUTPATIENT)
Dept: AUDIOLOGY | Facility: CLINIC | Age: 80
End: 2025-04-29
Payer: MEDICARE

## 2025-04-29 DIAGNOSIS — H90.3 SENSORINEURAL HEARING LOSS, ASYMMETRICAL: ICD-10-CM

## 2025-04-29 PROCEDURE — V5014 HEARING AID REPAIR/MODIFYING: HCPCS | Performed by: AUDIOLOGIST

## 2025-04-29 NOTE — PROGRESS NOTES
AUDIOLOGY  HEARING AID APPOINTMENT    Name:  Laurence Andino  :  1945  Age:  79 y.o.  Date of Service:  2025    Reason for visit: Ms. Andino is seen in the clinic today for a hearing aid check appointment. Patient has no complaints about her hearing aids; however, she has questions about replacing cerustop. Hearing aids were recently sent one month ago for an in-warranty  repair (warranty check).    HISTORY  Hearing Aid History:  Patient wears binaural 2021 Phonak Audeo P90-R rechargeable  in the canal (ZULEMA) hearing aids with 1M 5.0 (cerustop) receivers, small vented domes, and retention lines (right [id number] , left [id number]). There is no applicable warranty or in-office services plan. ERNESTO    Hearing Loss History:  Most recent audiologic evaluation performed on 2025.    HEARING AIDS  Hearing Aid Initial Listening Check:  Right: unremarkable  Left: unremarkable    Hearing Aid Physical Examination:  Right: unremarkable  Left: unremarkable    Hearing Aid Clean & Check:  Hearing aids were sanitized and checked. Receivers, domes, and retention lines were replaced. Microphones were brushed.    Hearing Aid Final Listening Check:  Right: unremarkable  Left: unremarkable    Hearing Aid Adjustment & Specific Counseling:  Offered to reprogram hearing aids based on most recent audiologic evaluation; however, patient deferred. Guided practice with cerustop and dome replacement; reviewed when to replace.    IMPRESSIONS  Hearing aids were returned to the patient and she expressed satisfaction.    RECOMMENDATIONS  - Continued consistent hearing aid use.  - Annual appointment for routine hearing aid maintenance, sooner if questions/problems arise.  - Consider new hearing aids.  - Audiologic evaluation in conjunction with otologic care, if an acute change is noted, and/or annually.  - Continue use of hearing protective devices when in loud, impact, and/or excessive noise.  -  Follow up with otolaryngology as planned.  - Follow-up with medical care team as planned.    PATIENT EDUCATION  Discussed results, impressions and recommendations with the patient. Questions were addressed and the patient was encouraged to contact our office should concerns arise.    CHARGE CAPTURE  $30. Self-pay. Paper encounter form utilized and submitted to  for payment processing.    Time for this encounter: [id number]    Macy Moreland, CCC-A  Licensed Audiologist

## 2025-05-12 ENCOUNTER — HOSPITAL ENCOUNTER (OUTPATIENT)
Dept: RADIOLOGY | Facility: HOSPITAL | Age: 80
Discharge: HOME | End: 2025-05-12
Payer: MEDICARE

## 2025-05-12 DIAGNOSIS — Z78.0 MENOPAUSE: ICD-10-CM

## 2025-05-12 DIAGNOSIS — Z13.820 ENCOUNTER FOR SCREENING FOR OSTEOPOROSIS: ICD-10-CM

## 2025-05-12 PROCEDURE — 77080 DXA BONE DENSITY AXIAL: CPT | Performed by: RADIOLOGY

## 2025-05-12 PROCEDURE — 77080 DXA BONE DENSITY AXIAL: CPT

## 2025-05-23 PROCEDURE — RXMED WILLOW AMBULATORY MEDICATION CHARGE

## 2025-05-27 ENCOUNTER — PHARMACY VISIT (OUTPATIENT)
Dept: PHARMACY | Facility: CLINIC | Age: 80
End: 2025-05-27
Payer: COMMERCIAL

## 2025-08-13 ENCOUNTER — OFFICE VISIT (OUTPATIENT)
Dept: URGENT CARE | Age: 80
End: 2025-08-13
Payer: MEDICARE

## 2025-08-13 VITALS
WEIGHT: 190 LBS | OXYGEN SATURATION: 98 % | DIASTOLIC BLOOD PRESSURE: 69 MMHG | SYSTOLIC BLOOD PRESSURE: 170 MMHG | RESPIRATION RATE: 16 BRPM | TEMPERATURE: 97.3 F | HEIGHT: 59 IN | HEART RATE: 79 BPM | BODY MASS INDEX: 38.3 KG/M2

## 2025-08-13 DIAGNOSIS — H65.192 OTHER NON-RECURRENT ACUTE NONSUPPURATIVE OTITIS MEDIA OF LEFT EAR: ICD-10-CM

## 2025-08-13 DIAGNOSIS — J01.00 ACUTE NON-RECURRENT MAXILLARY SINUSITIS: Primary | ICD-10-CM

## 2025-08-13 PROCEDURE — 1159F MED LIST DOCD IN RCRD: CPT

## 2025-08-13 PROCEDURE — 1160F RVW MEDS BY RX/DR IN RCRD: CPT

## 2025-08-13 PROCEDURE — 99213 OFFICE O/P EST LOW 20 MIN: CPT

## 2025-08-13 RX ORDER — DOXYCYCLINE 100 MG/1
100 CAPSULE ORAL 2 TIMES DAILY
Qty: 14 CAPSULE | Refills: 0 | Status: SHIPPED | OUTPATIENT
Start: 2025-08-13 | End: 2025-08-20

## 2025-08-13 ASSESSMENT — ENCOUNTER SYMPTOMS
SORE THROAT: 0
SINUS PRESSURE: 1
SHORTNESS OF BREATH: 0
EYE REDNESS: 0
FATIGUE: 0
DIARRHEA: 0
SINUS COMPLAINT: 1
COUGH: 1
CHEST TIGHTNESS: 0
EYE PAIN: 0
DIZZINESS: 0
WHEEZING: 0
STRIDOR: 0
EYE ITCHING: 0
VOMITING: 0
FEVER: 0
ABDOMINAL PAIN: 0
CHILLS: 0

## 2025-08-23 PROCEDURE — RXMED WILLOW AMBULATORY MEDICATION CHARGE

## 2025-08-25 ENCOUNTER — PHARMACY VISIT (OUTPATIENT)
Dept: PHARMACY | Facility: CLINIC | Age: 80
End: 2025-08-25
Payer: COMMERCIAL